# Patient Record
Sex: FEMALE | Race: WHITE | Employment: OTHER | URBAN - METROPOLITAN AREA
[De-identification: names, ages, dates, MRNs, and addresses within clinical notes are randomized per-mention and may not be internally consistent; named-entity substitution may affect disease eponyms.]

---

## 2023-06-08 LAB
ALANINE AMINOTRANSFERASE (SGPT) (U/L) IN SER/PLAS: 21 U/L (ref 7–45)
ALBUMIN (G/DL) IN SER/PLAS: 3.9 G/DL (ref 3.4–5)
ALKALINE PHOSPHATASE (U/L) IN SER/PLAS: 60 U/L (ref 33–136)
ANION GAP IN SER/PLAS: 15 MMOL/L (ref 10–20)
ASPARTATE AMINOTRANSFERASE (SGOT) (U/L) IN SER/PLAS: 20 U/L (ref 9–39)
BASOPHILS (10*3/UL) IN BLOOD BY AUTOMATED COUNT: 0.03 X10E9/L (ref 0–0.1)
BASOPHILS/100 LEUKOCYTES IN BLOOD BY AUTOMATED COUNT: 0.6 % (ref 0–2)
BILIRUBIN TOTAL (MG/DL) IN SER/PLAS: 0.7 MG/DL (ref 0–1.2)
CALCIUM (MG/DL) IN SER/PLAS: 9.7 MG/DL (ref 8.6–10.3)
CARBON DIOXIDE, TOTAL (MMOL/L) IN SER/PLAS: 27 MMOL/L (ref 21–32)
CHLORIDE (MMOL/L) IN SER/PLAS: 103 MMOL/L (ref 98–107)
CHOLESTEROL (MG/DL) IN SER/PLAS: 181 MG/DL (ref 0–199)
CHOLESTEROL IN HDL (MG/DL) IN SER/PLAS: 61.4 MG/DL
CHOLESTEROL/HDL RATIO: 2.9
CREATININE (MG/DL) IN SER/PLAS: 0.98 MG/DL (ref 0.5–1.05)
EOSINOPHILS (10*3/UL) IN BLOOD BY AUTOMATED COUNT: 0.16 X10E9/L (ref 0–0.4)
EOSINOPHILS/100 LEUKOCYTES IN BLOOD BY AUTOMATED COUNT: 3 % (ref 0–6)
ERYTHROCYTE DISTRIBUTION WIDTH (RATIO) BY AUTOMATED COUNT: 15.5 % (ref 11.5–14.5)
ERYTHROCYTE MEAN CORPUSCULAR HEMOGLOBIN CONCENTRATION (G/DL) BY AUTOMATED: 31.7 G/DL (ref 32–36)
ERYTHROCYTE MEAN CORPUSCULAR VOLUME (FL) BY AUTOMATED COUNT: 95 FL (ref 80–100)
ERYTHROCYTES (10*6/UL) IN BLOOD BY AUTOMATED COUNT: 4.37 X10E12/L (ref 4–5.2)
GFR FEMALE: 58 ML/MIN/1.73M2
GLUCOSE (MG/DL) IN SER/PLAS: 97 MG/DL (ref 74–99)
HEMATOCRIT (%) IN BLOOD BY AUTOMATED COUNT: 41.7 % (ref 36–46)
HEMOGLOBIN (G/DL) IN BLOOD: 13.2 G/DL (ref 12–16)
IMMATURE GRANULOCYTES/100 LEUKOCYTES IN BLOOD BY AUTOMATED COUNT: 0.4 % (ref 0–0.9)
LDL: 105 MG/DL (ref 0–99)
LEUKOCYTES (10*3/UL) IN BLOOD BY AUTOMATED COUNT: 5.4 X10E9/L (ref 4.4–11.3)
LYMPHOCYTES (10*3/UL) IN BLOOD BY AUTOMATED COUNT: 1.07 X10E9/L (ref 0.8–3)
LYMPHOCYTES/100 LEUKOCYTES IN BLOOD BY AUTOMATED COUNT: 19.9 % (ref 13–44)
MONOCYTES (10*3/UL) IN BLOOD BY AUTOMATED COUNT: 0.65 X10E9/L (ref 0.05–0.8)
MONOCYTES/100 LEUKOCYTES IN BLOOD BY AUTOMATED COUNT: 12.1 % (ref 2–10)
NEUTROPHILS (10*3/UL) IN BLOOD BY AUTOMATED COUNT: 3.45 X10E9/L (ref 1.6–5.5)
NEUTROPHILS/100 LEUKOCYTES IN BLOOD BY AUTOMATED COUNT: 64 % (ref 40–80)
PLATELETS (10*3/UL) IN BLOOD AUTOMATED COUNT: 167 X10E9/L (ref 150–450)
POTASSIUM (MMOL/L) IN SER/PLAS: 4.5 MMOL/L (ref 3.5–5.3)
PROTEIN TOTAL: 6.3 G/DL (ref 6.4–8.2)
SODIUM (MMOL/L) IN SER/PLAS: 140 MMOL/L (ref 136–145)
THYROTROPIN (MIU/L) IN SER/PLAS BY DETECTION LIMIT <= 0.05 MIU/L: 0.15 MIU/L (ref 0.44–3.98)
THYROXINE (T4) FREE (NG/DL) IN SER/PLAS: 1.67 NG/DL (ref 0.61–1.12)
TRIGLYCERIDE (MG/DL) IN SER/PLAS: 74 MG/DL (ref 0–149)
UREA NITROGEN (MG/DL) IN SER/PLAS: 16 MG/DL (ref 6–23)
VLDL: 15 MG/DL (ref 0–40)

## 2023-07-10 RX ORDER — BEMPEDOIC ACID 180 MG/1
1 TABLET, FILM COATED ORAL DAILY
COMMUNITY
Start: 2022-04-11 | End: 2024-04-22 | Stop reason: SDUPTHER

## 2023-07-10 RX ORDER — FLUTICASONE FUROATE AND VILANTEROL 200; 25 UG/1; UG/1
1 POWDER RESPIRATORY (INHALATION) DAILY
COMMUNITY
Start: 2022-06-21

## 2023-07-10 RX ORDER — CHOLECALCIFEROL (VITAMIN D3) 25 MCG
1 TABLET ORAL DAILY
COMMUNITY
Start: 2021-04-20

## 2023-07-10 RX ORDER — LEVOTHYROXINE SODIUM 88 UG/1
1 TABLET ORAL DAILY
COMMUNITY
Start: 2021-04-20 | End: 2023-07-11 | Stop reason: DRUGHIGH

## 2023-07-10 RX ORDER — MONTELUKAST SODIUM 10 MG/1
1 TABLET ORAL DAILY
COMMUNITY
Start: 2021-04-20 | End: 2023-07-11 | Stop reason: SDUPTHER

## 2023-07-10 RX ORDER — VERAPAMIL HYDROCHLORIDE 180 MG/1
1 CAPSULE, EXTENDED RELEASE ORAL NIGHTLY
COMMUNITY
Start: 2021-04-20 | End: 2023-07-11 | Stop reason: SDUPTHER

## 2023-07-10 RX ORDER — EPINEPHRINE 0.22MG
AEROSOL WITH ADAPTER (ML) INHALATION DAILY
COMMUNITY
Start: 2021-04-20

## 2023-07-10 RX ORDER — VITAMIN E (DL,TOCOPHERYL ACET) 180 MG
CAPSULE ORAL DAILY
COMMUNITY
Start: 2022-01-14

## 2023-07-10 RX ORDER — MINERAL OIL
1 ENEMA (ML) RECTAL DAILY
COMMUNITY
Start: 2021-04-20

## 2023-07-10 RX ORDER — PROPRANOLOL HYDROCHLORIDE 10 MG/1
TABLET ORAL
COMMUNITY
Start: 2021-04-20

## 2023-07-10 RX ORDER — ALBUTEROL SULFATE 90 UG/1
2 AEROSOL, METERED RESPIRATORY (INHALATION) 4 TIMES DAILY PRN
COMMUNITY
Start: 2021-04-20 | End: 2023-07-11 | Stop reason: SDUPTHER

## 2023-07-10 RX ORDER — AZELASTINE 1 MG/ML
1 SPRAY, METERED NASAL 2 TIMES DAILY
COMMUNITY
Start: 2022-06-21

## 2023-07-10 RX ORDER — ISOSORBIDE MONONITRATE 60 MG/1
1 TABLET, EXTENDED RELEASE ORAL DAILY
COMMUNITY
Start: 2021-04-20 | End: 2024-01-30

## 2023-07-11 ENCOUNTER — OFFICE VISIT (OUTPATIENT)
Dept: PRIMARY CARE | Facility: CLINIC | Age: 78
End: 2023-07-11
Payer: MEDICARE

## 2023-07-11 VITALS
WEIGHT: 157 LBS | HEART RATE: 78 BPM | BODY MASS INDEX: 26.16 KG/M2 | TEMPERATURE: 97.9 F | HEIGHT: 65 IN | OXYGEN SATURATION: 98 % | DIASTOLIC BLOOD PRESSURE: 68 MMHG | SYSTOLIC BLOOD PRESSURE: 120 MMHG

## 2023-07-11 DIAGNOSIS — E78.00 HYPERCHOLESTEROLEMIA: ICD-10-CM

## 2023-07-11 DIAGNOSIS — J30.1 ALLERGIC RHINITIS DUE TO POLLEN, UNSPECIFIED SEASONALITY: ICD-10-CM

## 2023-07-11 DIAGNOSIS — D86.0 SARCOIDOSIS OF LUNG (MULTI): ICD-10-CM

## 2023-07-11 DIAGNOSIS — I25.10 CORONARY ARTERY DISEASE INVOLVING NATIVE CORONARY ARTERY OF NATIVE HEART WITHOUT ANGINA PECTORIS: ICD-10-CM

## 2023-07-11 DIAGNOSIS — I10 PRIMARY HYPERTENSION: Primary | ICD-10-CM

## 2023-07-11 DIAGNOSIS — E03.9 HYPOTHYROIDISM, UNSPECIFIED TYPE: ICD-10-CM

## 2023-07-11 DIAGNOSIS — J45.20 MILD INTERMITTENT ASTHMA WITHOUT COMPLICATION (HHS-HCC): ICD-10-CM

## 2023-07-11 PROBLEM — J30.9 ALLERGIC RHINITIS: Status: ACTIVE | Noted: 2023-07-11

## 2023-07-11 PROBLEM — J45.909 BRONCHIAL ASTHMA (HHS-HCC): Status: ACTIVE | Noted: 2023-07-11

## 2023-07-11 PROCEDURE — 3074F SYST BP LT 130 MM HG: CPT | Performed by: INTERNAL MEDICINE

## 2023-07-11 PROCEDURE — 1159F MED LIST DOCD IN RCRD: CPT | Performed by: INTERNAL MEDICINE

## 2023-07-11 PROCEDURE — 99214 OFFICE O/P EST MOD 30 MIN: CPT | Performed by: INTERNAL MEDICINE

## 2023-07-11 PROCEDURE — 3078F DIAST BP <80 MM HG: CPT | Performed by: INTERNAL MEDICINE

## 2023-07-11 PROCEDURE — 1036F TOBACCO NON-USER: CPT | Performed by: INTERNAL MEDICINE

## 2023-07-11 RX ORDER — MONTELUKAST SODIUM 10 MG/1
10 TABLET ORAL DAILY
Qty: 90 TABLET | Refills: 1 | Status: SHIPPED | OUTPATIENT
Start: 2023-07-11 | End: 2024-01-09 | Stop reason: SDUPTHER

## 2023-07-11 RX ORDER — ALBUTEROL SULFATE 90 UG/1
2 AEROSOL, METERED RESPIRATORY (INHALATION) 4 TIMES DAILY PRN
Qty: 54 G | Refills: 1 | Status: SHIPPED | OUTPATIENT
Start: 2023-07-11

## 2023-07-11 RX ORDER — VERAPAMIL HYDROCHLORIDE 180 MG/1
180 CAPSULE, EXTENDED RELEASE ORAL NIGHTLY
Qty: 90 CAPSULE | Refills: 1 | Status: SHIPPED | OUTPATIENT
Start: 2023-07-11 | End: 2024-01-09 | Stop reason: SDUPTHER

## 2023-07-11 RX ORDER — LEVOTHYROXINE SODIUM 75 UG/1
75 TABLET ORAL DAILY
Qty: 90 TABLET | Refills: 1 | Status: SHIPPED | OUTPATIENT
Start: 2023-07-11 | End: 2024-01-09 | Stop reason: SDUPTHER

## 2023-07-11 ASSESSMENT — ENCOUNTER SYMPTOMS
DEPRESSION: 0
OCCASIONAL FEELINGS OF UNSTEADINESS: 0
LOSS OF SENSATION IN FEET: 0

## 2023-07-11 NOTE — PROGRESS NOTES
"Subjective   Patient ID: Maggie Bazan is a 77 y.o. female who presents for 6 month follow up .  Patient presents today in follow up re:   HTN, hypothyroid and         Review of Systems    Objective     Blood pressure 120/68, pulse 78, temperature 36.6 °C (97.9 °F), temperature source Temporal, height 1.638 m (5' 4.5\"), weight 71.2 kg (157 lb), SpO2 98 %.   Physical Exam    Assessment/Plan   Problem List Items Addressed This Visit       Allergic rhinitis    CAD (coronary artery disease)    Relevant Medications    isosorbide mononitrate ER (Imdur) 60 mg 24 hr tablet    verapamil ER (Veralan PM) 180 mg 24 hr capsule    propranolol (Inderal) 10 mg tablet    Bronchial asthma    HTN (hypertension) - Primary    Hypercholesterolemia    Hypothyroidism    Sarcoidosis of lung (CMS/HCC)     BP well controlled on current therapy.  Will continue present therapy and follow.  Lab shows current dose of thyroid replacement is too high.  Will reduce dose to 75 mcg daily and follow.  Asthma Sx well compensated on current therapy.  Will continue present medication therapy and follow clinically.  She continues to follow with Cardio and Pulmonology in re:  respective issues.    > 30 minutes was spent with the patient today, the majority of which was spent on review of history and medications as well as counselling on care plan and/or coordination of care.     Follow up in 6 months  Lab to be drawn 1 week prior to next office visit.      "

## 2023-08-07 DIAGNOSIS — E04.1 THYROID NODULE: Primary | ICD-10-CM

## 2023-08-16 DIAGNOSIS — E04.1 THYROID NODULE: Primary | ICD-10-CM

## 2023-08-24 ENCOUNTER — HOSPITAL ENCOUNTER (OUTPATIENT)
Dept: DATA CONVERSION | Facility: HOSPITAL | Age: 78
End: 2023-08-24
Attending: RADIOLOGY
Payer: MEDICARE

## 2023-08-24 DIAGNOSIS — E04.1 NONTOXIC SINGLE THYROID NODULE: ICD-10-CM

## 2023-08-28 LAB
COMPLETE PATHOLOGY REPORT: NORMAL
CONVERTED CLINICAL DIAGNOSIS-HISTORY: NORMAL
CONVERTED DIAGNOSIS COMMENT: NORMAL
CONVERTED FINAL DIAGNOSIS: NORMAL
CONVERTED FINAL REPORT PDF LINK TO COPY AND PASTE: NORMAL
CONVERTED SPECIMEN DESCRIPTION: NORMAL

## 2023-12-01 ENCOUNTER — LAB (OUTPATIENT)
Dept: LAB | Facility: LAB | Age: 78
End: 2023-12-01
Payer: MEDICARE

## 2023-12-01 DIAGNOSIS — I10 PRIMARY HYPERTENSION: ICD-10-CM

## 2023-12-01 DIAGNOSIS — E03.9 HYPOTHYROIDISM, UNSPECIFIED TYPE: ICD-10-CM

## 2023-12-01 DIAGNOSIS — I25.10 CORONARY ARTERY DISEASE INVOLVING NATIVE CORONARY ARTERY OF NATIVE HEART WITHOUT ANGINA PECTORIS: ICD-10-CM

## 2023-12-01 DIAGNOSIS — E78.00 HYPERCHOLESTEROLEMIA: ICD-10-CM

## 2023-12-01 DIAGNOSIS — E04.1 THYROID NODULE: ICD-10-CM

## 2023-12-01 LAB
ALBUMIN SERPL BCP-MCNC: 4.1 G/DL (ref 3.4–5)
ALP SERPL-CCNC: 63 U/L (ref 33–136)
ALT SERPL W P-5'-P-CCNC: 18 U/L (ref 7–45)
ANION GAP SERPL CALC-SCNC: 13 MMOL/L (ref 10–20)
AST SERPL W P-5'-P-CCNC: 22 U/L (ref 9–39)
BILIRUB SERPL-MCNC: 0.7 MG/DL (ref 0–1.2)
BUN SERPL-MCNC: 19 MG/DL (ref 6–23)
CALCIUM SERPL-MCNC: 9.6 MG/DL (ref 8.6–10.3)
CHLORIDE SERPL-SCNC: 103 MMOL/L (ref 98–107)
CHOLEST SERPL-MCNC: 206 MG/DL (ref 0–199)
CHOLESTEROL/HDL RATIO: 3.5
CO2 SERPL-SCNC: 27 MMOL/L (ref 21–32)
CREAT SERPL-MCNC: 0.99 MG/DL (ref 0.5–1.05)
GFR SERPL CREATININE-BSD FRML MDRD: 58 ML/MIN/1.73M*2
GLUCOSE SERPL-MCNC: 96 MG/DL (ref 74–99)
HDLC SERPL-MCNC: 58.1 MG/DL
LDLC SERPL CALC-MCNC: 126 MG/DL
NON HDL CHOLESTEROL: 148 MG/DL (ref 0–149)
POTASSIUM SERPL-SCNC: 4.2 MMOL/L (ref 3.5–5.3)
PROT SERPL-MCNC: 6.9 G/DL (ref 6.4–8.2)
SODIUM SERPL-SCNC: 139 MMOL/L (ref 136–145)
TRIGL SERPL-MCNC: 111 MG/DL (ref 0–149)
TSH SERPL-ACNC: 0.8 MIU/L (ref 0.44–3.98)
VLDL: 22 MG/DL (ref 0–40)

## 2023-12-01 PROCEDURE — 36415 COLL VENOUS BLD VENIPUNCTURE: CPT

## 2023-12-01 PROCEDURE — 80053 COMPREHEN METABOLIC PANEL: CPT

## 2023-12-01 PROCEDURE — 84443 ASSAY THYROID STIM HORMONE: CPT

## 2023-12-01 PROCEDURE — 80061 LIPID PANEL: CPT

## 2023-12-03 PROBLEM — I50.32 CHRONIC DIASTOLIC HEART FAILURE (MULTI): Status: ACTIVE | Noted: 2023-12-03

## 2023-12-03 PROBLEM — Z78.9 STATIN INTOLERANCE: Status: ACTIVE | Noted: 2023-12-03

## 2023-12-03 PROBLEM — E78.5 DYSLIPIDEMIA: Status: ACTIVE | Noted: 2023-12-03

## 2023-12-04 NOTE — PROGRESS NOTES
[facility] and Vascular Cardiology    Patient Name: Maggie Bazan  Patient : 1945      Scribe Attestation  By signing my name below, IViviana Scribe   attest that this documentation has been prepared under the direction and in the presence of Enrique Esquivel DO.      Reason for visit:  This is an 78-year-old female here for follow-up regarding her history of coronary artery disease as seen on cardiac catheterization done in 2017, chronic diastolic heart failure, hypertension, dyslipidemia with reported statin/Zetia intolerance.      HPI:  This is an 78-year-old female here for follow-up regarding her history of coronary artery disease as seen on cardiac catheterization done in 2017, chronic diastolic heart failure, hypertension, dyslipidemia with reported statin/Zetia intolerance.  The patient was last evaluated by me in 2022.  At that visit I discussed the possibility of a PCSK9 inhibitor to reduce her cholesterol which she declined and asked that she follow-up in 6 months and sooner if necessary.  The patient was subsequently seen by the cardiology NP in  at which time lab work including a lipid panel was ordered and she was asked to follow-up in December.  CMP done in 2023 showed normal serum sodium and potassium with a serum creatinine of 0.99, normal ALT/AST.  TSH was 0.80. Lipid panel done in 2023 showed an LDL cholesterol 126 and triglycerides of 111 while on bempedoic acid 180 mg daily. ECG done today showed sinus rhythm with sinus arrhythmia with a heart rate of 65 bpm.  The patient reports that she has been feeling generally well from the cardiac standpoint. She denies any new chest pain, shortness of breath, palpitations and lightheadedness. She states that she takes all of her medications as prescribed. During my exam, she was resting comfortably on the exam table.             Assessment/Plan:   1. Coronary artery  disease  The patient has a reported history of history of coronary artery disease as seen on cardiac catheterization done in June 2017.  She also had a history of vasospasm and is on Imdur and verapamil for symptom control.  Nuclear stress done 4/27/2022 showed a small partially reversible perfusion defect in the apical wall which resolved on prone imaging consistent with soft tissue attenuation, low probability of ischemia, calculated ejection fraction 64%.  ECG done today showed sinus arrhythmia with a heart rate of 65 bpm.     She denies anginal chest discomfort.   Blood pressure appears controlled on exam today.  She should continue her current antihypertensive medications.  Recent lab works as noted in the HPI.   Lipid panel done in December 2023 showed an LDL cholesterol 126 and triglycerides of 111 while on bempedoic acid 180 mg daily.   I would like to see a further decrease in her LDL cholesterol. I will start her on Repatha 140 mg subcutaneous every 2 weeks.  Lab works as noted below will be done in 6 months prior to his next visit.  Please see lifestyle recommendations below.  Follow up in 6 months and sooner if necessary.      2. Chronic diastolic heart failure  The patient has a history of chronic diastolic heart failure.  Nuclear stress done 4/27/2022 showed a small partially reversible perfusion defect in the apical wall which resolved on prone imaging consistent with soft tissue attenuation, low probability of ischemia, calculated ejection fraction 64%.  The patient reports having shortness of breath that responds to her inhalers.  Outside echocardiogram done 3/8/2021 showed normal left ventricular systolic function with an ejection fraction of 62%, left ventricular diastolic function, normal right ventricular systolic function, mild mitral and tricuspid valve regurgitation.  She does not appear significantly volume overloaded on physical exam.  She should continue current cardiac medications.    Recent lab works as noted in the HPI.   Lab works as noted below will be done in 6 months prior to his next visit.  I discussed with her the importance of following a low-sodium heart healthy diet.  Follow up in 6 months and sooner if necessary.      3. Hypertension  The patient has a history of hypertension and blood pressure appears controlled on exam today.   She should continue her current antihypertensive medications.      4. Dyslipidemia/Statin and Zetia intolerance  The patient has a history of dyslipidemia with reported statin/Zetia intolerance.  Lipid panel done in December 2023 showed an LDL cholesterol 126 and triglycerides of 111 while on bempedoic acid 180 mg daily.   I would like to see a further decrease in her LDL cholesterol. I will start her on Repatha 140 mg subcutaneous every 2 weeks.  I will update lipid panel in 6 months.  Please see lifestyle recommendations below.      Orders:   Start Repatha 140 mg subcutaneous every 2 weeks  CMP/lipid/magnesium/CBC/BNP in 6 months,   Follow-up in 6 months.    Lifestyle Recommendations  I recommend a whole-food plant-based diet, an eating pattern that encourages the consumption of unrefined plant foods (such as fruits, vegetables, tubers, whole grains, legumes, nuts and seeds) and discourages meats, dairy products, eggs and processed foods.     The AHA/ACC recommends that the patient consume a dietary pattern that emphasizes intake of vegetables, fruits, and whole grains; includes low-fat dairy products, poultry, fish, legumes, non-tropical vegetable oils, and nuts; and limits intake of sodium, sweets, sugar-sweetened beverages, and red meats.  Adapt this dietary pattern to appropriate calorie requirements (a 500-750 kcal/day deficit to loose weight), personal and cultural food preferences, and nutrition therapy for other medical conditions (including diabetes).  Achieve this pattern by following plans such as the [place] Mediterranean, DASH dietary pattern,  or AHA diet.     Engage in 2 hours and 30 minutes per week of moderate-intensity physical activity, or 1 hour and 15 minutes (75 minutes) per week of vigorous-intensity aerobic physical activity, or an equivalent combination of moderate and vigorous-intensity aerobic physical activity. Aerobic activity should be performed in episodes of at least 10 minutes preferably spread throughout the week.     Adhering to a heart healthy diet, regular exercise habits, avoidance of tobacco products, and maintenance of a healthy weight are crucial components of their heart disease risk reduction.     Any positive review of systems not specifically addressed in the office visit today should be evaluated and treated by the patients primary care physician or in an emergency department if necessary     Patient was notified that results from ordered tests will be called to the patient if it changes current management; it will otherwise be discussed at a future appointment and available on  [facility].     Thank you for allowing me to participate in the care of this patient.        This document was generated using the assistance of voice recognition software. If there are any errors of spelling, grammar, syntax, or meaning; please feel free to contact me directly for clarification.    Past Medical History:  She has no past medical history on file.    Past Surgical History:  She has a past surgical history that includes Other surgical history (04/20/2021); Other surgical history (04/20/2021); Other surgical history (04/20/2021); Other surgical history (05/07/2021); Other surgical history (05/07/2021); Tonsillectomy; Knee surgery (Right); and US guided thyroid biopsy (8/24/2023).      Social History:  She reports that she quit smoking about 31 years ago. Her smoking use included cigarettes. She has a 20.00 pack-year smoking history. She has never used smokeless tobacco. She reports current alcohol use of about 5.0 - 6.0 standard drinks  of alcohol per week. She reports that she does not use drugs.    Family History:  Family History   Problem Relation Name Age of Onset    Hypertension Mother      Hyperlipidemia Mother      Hyperlipidemia Father      Heart attack Mother's Brother      Heart attack Mother's Brother      Breast cancer Father's Sister      Breast cancer Father's Sister          Allergies:  Ezetimibe and Statins-hmg-coa reductase inhibitors    Outpatient Medications:  Current Outpatient Medications   Medication Instructions    albuterol 90 mcg/actuation inhaler 2 puffs, inhalation, 4 times daily PRN    azelastine (Astelin) 137 mcg (0.1 %) nasal spray 1 spray, nasal, 2 times daily    bempedoic acid (Nexletol) 180 mg tablet 1 tablet, oral, Daily    cholecalciferol (Vitamin D-3) 25 MCG (1000 UT) tablet 1 tablet, oral, Daily    coenzyme Q-10 100 mg capsule oral, Daily    fexofenadine (Allegra) 180 mg tablet 1 tablet, oral, Daily    fish oil concentrate (Omega-3) 120-180 mg capsule oral, Daily    fluticasone furoate-vilanteroL (Breo Ellipta) 200-25 mcg/dose inhaler 1 puff, inhalation, Daily    isosorbide mononitrate ER (Imdur) 60 mg 24 hr tablet 1 tablet, oral, Daily    L. acidophilus/Bifid. animalis 32 billion cell capsule oral, Daily    levothyroxine (SYNTHROID, LEVOXYL) 75 mcg, oral, Daily    montelukast (SINGULAIR) 10 mg, oral, Daily    multivit-min-folic acid-biotin (Hair,Skin and Nails,FA-biotin,) 133.3 mcg- 1,666.7 mcg capsule oral, Daily    propranolol (Inderal) 10 mg tablet oral    verapamil ER (VERALAN PM) 180 mg, oral, Nightly        ROS:  A 14 point review of systems was done and is negative other than as stated in HPI    Vitals:      7/13/2022    12:17 PM 9/20/2022    10:08 AM 12/2/2022     1:20 PM 1/12/2023    11:01 AM 3/20/2023     9:23 AM 6/15/2023    10:05 AM 7/11/2023    10:31 AM   Vitals   Systolic 120 123 128 130 152 160 120   Diastolic 64 68 68 60 74 72 68   Heart Rate 77 76 72 75 68 80 78   Temp 36.3 °C (97.4 °F) 36.2 °C  "(97.1 °F)  36.1 °C (96.9 °F) 36.4 °C (97.6 °F)  36.6 °C (97.9 °F)   Resp  16   16     Height (in)   1.626 m (5' 4\") 1.632 m (5' 4.25\")  1.638 m (5' 4.5\") 1.638 m (5' 4.5\")   Weight (lb) 166 168.2 170 166.2 162.8 154 157   BMI 28.49 kg/m2 28.87 kg/m2 29.18 kg/m2 28.31 kg/m2 27.73 kg/m2 26.03 kg/m2 26.53 kg/m2   BSA (m2) 1.84 m2 1.86 m2 1.87 m2 1.85 m2 1.83 m2 1.78 m2 1.8 m2        Physical Exam:   Constitutional: Cooperative, in no acute distress, alert, appears stated age.  Skin: Skin color, texture, turgor normal. No rashes or lesions.  Head: Normocephalic. No masses, lesions, tenderness or abnormalities  Eyes: Extraocular movements are grossly intact.  Mouth and throat: Mucous membranes moist  Neck: Neck supple, no carotid bruits, no JVD  Respiratory: Lungs clear to auscultation, no wheezing or rhonchi, no use of accessory muscles  Chest wall: No scars, normal excursion with respiration  Cardiovascular: Regular rhythm without murmur, gallop, or rubs  Gastrointestinal: Abdomen soft, nontender. Bowel sounds normal.  Musculoskeletal: Strength equal in upper extremities  Extremities: No bilateral pitting edema  Neurologic: Sensation grossly intact, alert and oriented x3.    Intake/Output:   No intake/output data recorded.    Outpatient Medications  Current Outpatient Medications on File Prior to Visit   Medication Sig Dispense Refill    albuterol 90 mcg/actuation inhaler Inhale 2 puffs 4 times a day as needed for wheezing or shortness of breath. 54 g 1    azelastine (Astelin) 137 mcg (0.1 %) nasal spray Administer 1 spray into affected nostril(s) 2 times a day.      bempedoic acid (Nexletol) 180 mg tablet Take 1 tablet by mouth once daily.      cholecalciferol (Vitamin D-3) 25 MCG (1000 UT) tablet Take 1 tablet (25 mcg) by mouth once daily.      coenzyme Q-10 100 mg capsule Take by mouth once daily.      fexofenadine (Allegra) 180 mg tablet Take 1 tablet (180 mg) by mouth once daily.      fish oil concentrate " (Omega-3) 120-180 mg capsule Take by mouth once daily.      fluticasone furoate-vilanteroL (Breo Ellipta) 200-25 mcg/dose inhaler Inhale 1 puff once daily.      isosorbide mononitrate ER (Imdur) 60 mg 24 hr tablet Take 1 tablet (60 mg) by mouth once daily.      L. acidophilus/Bifid. animalis 32 billion cell capsule Take by mouth once daily.      levothyroxine (Synthroid, Levoxyl) 75 mcg tablet Take 1 tablet (75 mcg) by mouth once daily. 90 tablet 1    montelukast (Singulair) 10 mg tablet Take 1 tablet (10 mg) by mouth once daily. 90 tablet 1    multivit-min-folic acid-biotin (Hair,Skin and Nails,FA-biotin,) 133.3 mcg- 1,666.7 mcg capsule Take by mouth once daily.      propranolol (Inderal) 10 mg tablet Take by mouth.      verapamil ER (Veralan PM) 180 mg 24 hr capsule Take 1 capsule (180 mg) by mouth once daily at bedtime. 90 capsule 1     No current facility-administered medications on file prior to visit.       Labs: (past 26 weeks)  Recent Results (from the past 4368 hour(s))   TSH with reflex to Free T4 if abnormal    Collection Time: 06/08/23 10:15 AM   Result Value Ref Range    TSH 0.15 (L) 0.44 - 3.98 mIU/L   Lipid Panel    Collection Time: 06/08/23 10:15 AM   Result Value Ref Range    Cholesterol 181 0 - 199 mg/dL    HDL 61.4 mg/dL    Cholesterol/HDL Ratio 2.9      (H) 0 - 99 mg/dL    VLDL 15 0 - 40 mg/dL    Triglycerides 74 0 - 149 mg/dL   Comprehensive Metabolic Panel    Collection Time: 06/08/23 10:15 AM   Result Value Ref Range    Glucose 97 74 - 99 mg/dL    Sodium 140 136 - 145 mmol/L    Potassium 4.5 3.5 - 5.3 mmol/L    Chloride 103 98 - 107 mmol/L    Bicarbonate 27 21 - 32 mmol/L    Anion Gap 15 10 - 20 mmol/L    Urea Nitrogen 16 6 - 23 mg/dL    Creatinine 0.98 0.50 - 1.05 mg/dL    GFR Female 58 (A) >90 mL/min/1.73m2    Calcium 9.7 8.6 - 10.3 mg/dL    Albumin 3.9 3.4 - 5.0 g/dL    Alkaline Phosphatase 60 33 - 136 U/L    Total Protein 6.3 (L) 6.4 - 8.2 g/dL    AST 20 9 - 39 U/L    Total  Bilirubin 0.7 0.0 - 1.2 mg/dL    ALT (SGPT) 21 7 - 45 U/L   CBC and Auto Differential    Collection Time: 06/08/23 10:15 AM   Result Value Ref Range    WBC 5.4 4.4 - 11.3 x10E9/L    RBC 4.37 4.00 - 5.20 x10E12/L    Hemoglobin 13.2 12.0 - 16.0 g/dL    Hematocrit 41.7 36.0 - 46.0 %    MCV 95 80 - 100 fL    MCHC 31.7 (L) 32.0 - 36.0 g/dL    Platelets 167 150 - 450 x10E9/L    RDW 15.5 (H) 11.5 - 14.5 %    Neutrophils % 64.0 40.0 - 80.0 %    Immature Granulocytes %, Automated 0.4 0.0 - 0.9 %    Lymphocytes % 19.9 13.0 - 44.0 %    Monocytes % 12.1 2.0 - 10.0 %    Eosinophils % 3.0 0.0 - 6.0 %    Basophils % 0.6 0.0 - 2.0 %    Neutrophils Absolute 3.45 1.60 - 5.50 x10E9/L    Lymphocytes Absolute 1.07 0.80 - 3.00 x10E9/L    Monocytes Absolute 0.65 0.05 - 0.80 x10E9/L    Eosinophils Absolute 0.16 0.00 - 0.40 x10E9/L    Basophils Absolute 0.03 0.00 - 0.10 x10E9/L   Thyroxine, Free    Collection Time: 06/08/23 10:15 AM   Result Value Ref Range    Free T4 1.67 (H) 0.61 - 1.12 ng/dL   CYTOLOGY NON-GYN RESULTS    Collection Time: 08/24/23 12:00 AM   Result Value Ref Range    Pathology Report                                                       MRN: [id number]  Patient Name JACKELINE CHANDLER  Accession #: [id number]  Date of Procedure:  8/24/2023       Date Reported: 8/28/2023  Date Received:  8/24/2023  Date of Birth / Sex 1945 (Age: 77) / F  Race: WHITE  Submitting Physician: CAMILLE HUDSON MD  Attending Physician: LYNETTE GARCIA M.D.           Other External #          FINAL CYTOLOGICAL INTERPRETATION    A.  FINE NEEDLE ASPIRATION OF THYROID - LEFT LOBE NODULE:       - NO MALIGNANT CELLS IDENTIFIED.       - CYTOLOGIC FINDINGS CONSISTENT WITH A BENIGN FOLLICULAR NODULE.    Note: The benign follicular nodule (BFN) encompasses a group of benign lesions  with similar cytologic findings that are classified histologically as nodules  in nodular goiter, hyperplastic (adenomatoid) nodules, colloid nodules, nodules  in Graves'  disease, and a subset of follicular adenomas. The distinction among  these different histologic entities is not possible by FNA, but this is of  little i mportance because they are all benign and can be managed in a similar,  conservative manner. This case has been evaluated using direct smears and  concentrated (ThinPrep) preparations.  All slides showed similar findings.     Lutz System Implied Risk of Malignancy and Recommended Clinical Management  Diagnostic category                      Risk of Malignancy      Usual  management  Nondiagnostic/Unsatisfactory                 5-10%                Repeat FNA  with ultrasound guidance  Benign                                                     0-3%                   Clinical and US follow-up  Atypia of undetermined significance      10-35%               Repeat FNA,  molecular testing, or     or Follicular lesion of US                     lobectomy  Follicular neoplasm/suspicious for        25-40%               Molecular  testing, surgical lobectomy     follicular neoplasm  Suspicious for malignancy                     50-75%               Lobectomy or  near total thyroidectomy  [facility] gnant                                               97-99%                Lobectomy or near total thyroidectomy      Slide(s) initially screened by a Cytotechnologist at [facility], [address] [place] [address]    Electronically Signed Out By ALKA DAVILA M.D.    By the signature on this report, the individual or group listed as making the  Final Interpretation/Diagnosis certifies that they have reviewed this case.  Slide(s) initially screened by a Cytotechnologist at [facility]  Diagnostic interpretation performed at [place] [phone]       Clinical History      2.6 x 1.6 x 1.3 cm solid hypoechoic nodule - left lobe of thyroid   Source of Specimen  A: FINE NEEDLE ASPIRATION  OF THYROID - LEFT LOBE NODULE     Specimen Submitted as:  A:  FINE NEEDLE ASPIRATION OF THYROID - LEFT LOBE NODULE        Pap non-gyn ThinPrep slide, Pap stain Non-Gyn, Pap stain Non-Gyn, Pap  stain Non-Gyn,  Pap stain Non-Gyn, Diff-Quik stain Non-Gyn, Diff-Quik stain  Non-Gyn, Diff-Quik stain Non-Gyn, Diff-Quik stain Non-Gyn    Gross Description  A.  FINE NEEDLE ASPIRATION OF THYROID - LEFT LOBE NODULE:  RECEIVED 8 DIRECT  SMEARS (4 AIR-DRIED AND 4 SPRAY-FIXED) AND 30 cc OF COLORLESS CLEAR NEEDLE  RINSE IN CYTOLYT WITH PARTICLES.               [facility]  Department of Pathology  [address] [place] [address]       Comprehensive Metabolic Panel    Collection Time: 12/01/23  8:27 AM   Result Value Ref Range    Glucose 96 74 - 99 mg/dL    Sodium 139 136 - 145 mmol/L    Potassium 4.2 3.5 - 5.3 mmol/L    Chloride 103 98 - 107 mmol/L    Bicarbonate 27 21 - 32 mmol/L    Anion Gap 13 10 - 20 mmol/L    Urea Nitrogen 19 6 - 23 mg/dL    Creatinine 0.99 0.50 - 1.05 mg/dL    eGFR 58 (L) >60 mL/min/1.73m*2    Calcium 9.6 8.6 - 10.3 mg/dL    Albumin 4.1 3.4 - 5.0 g/dL    Alkaline Phosphatase 63 33 - 136 U/L    Total Protein 6.9 6.4 - 8.2 g/dL    AST 22 9 - 39 U/L    Bilirubin, Total 0.7 0.0 - 1.2 mg/dL    ALT 18 7 - 45 U/L   Lipid Panel    Collection Time: 12/01/23  8:27 AM   Result Value Ref Range    Cholesterol 206 (H) 0 - 199 mg/dL    HDL-Cholesterol 58.1 mg/dL    Cholesterol/HDL Ratio 3.5     LDL Calculated 126 (H) <=99 mg/dL    VLDL 22 0 - 40 mg/dL    Triglycerides 111 0 - 149 mg/dL    Non HDL Cholesterol 148 0 - 149 mg/dL   Tsh With Reflex To Free T4 If Abnormal    Collection Time: 12/01/23  8:27 AM   Result Value Ref Range    Thyroid Stimulating Hormone 0.80 0.44 - 3.98 mIU/L       ECG  No results found for this or any previous visit (from the past 4464 hour(s)).    Echocardiogram  No results found for this or any previous visit from the past 1095 days.      CV  Studies:  EKG:No results found for this or any previous visit (from the past 4464 hour(s)).  Echocardiogram:   ECHOCARDIOGRAM     Narrative  Ordered by an unspecified provider.    Stress Testing LIZETH(RYQ9190:1:1825):   NM CARDIAC STRESS REST (MYOCARDIAL PERFUSION MIBI) 04/27/2022    Narrative  MRN: [id number]  Patient Name: JACKELINE CHANDLER    STUDY:  CARDIAC STRESS/REST INJECTION; PART 2 STRESS OR REST (NO CHARGE);  CARDIAC STRESS/REST (MYOCARDIAL PERFUSION/MIBI);  4/27/2022 10:16 am    INDICATION:  SOB  I10: HTN (hypertension) E78.00: Hypercholesterolemia I25.118:  Coronary artery disease with other form of angina pectoris.    COMPARISON:  None.    ACCESSION NUMBER(S):  [id number]; [id number]; [id number]    ORDERING CLINICIAN:  MARYCARMEN LANDA    TECHNIQUE:  DIVISION OF NUCLEAR MEDICINE  STRESS MYOCARDIAL PERFUSION SCAN, ONE DAY PROTOCOL    The patient received an intravenous dose of  11.5 mCi of Tc-99m  tetrofosmin and resting emission tomographic (SPECT) images of the  myocardium were acquired. The patient then exercised via treadmill  stress to  85 % of MPHR and achieved  10.1 METS. At peak stress  35.7  mCi of Tc-99m tetrofosmin were administered and stress phase SPECT  images of the myocardium were then acquired. These included ECG-gated  images to assess and quantify ventricular function.    FINDINGS:  There is a small partially reversible perfusion defect in the apical  wall which resolves on prone imaging suggesting soft tissue  attenuation.    Calculated ejection fraction is 64% without segmental wall motion  abnormalities seen.    Impression  1. There is a small partially reversible perfusion defect in the  apical wall which resolves on prone imaging suggesting soft tissue  attenuation. There is a low probability of ischemia.    2. Calculated ejection fraction is 64% without segmental wall motion  abnormalities seen.    Cardiac Catheterization: No results found for this or any previous visit from the past 1825  days.  No results found for this or any previous visit from the past 3650 days.     Cardiac Scoring: No results found for this or any previous visit from the past 1825 days.    AAA : No results found for this or any previous visit from the past 1825 days.    OTHER: No results found for this or any previous visit from the past 1825 days.    LAST IMAGING RESULTS  US guided thyroid biopsy  Narrative: Interpreted By:  LYNETET GARCIA MD  MRN: [id number]  Patient Name: JACKELINE CHANDLER     STUDY:  US BIOPSY THYROID;  8/24/2023 1:19 pm     INDICATION:  see Dx and prior ultrasound.     COMPARISON:  None.     ACCESSION NUMBER(S):  [id number]     ORDERING CLINICIAN:  CAMILLE HUDSON     TECHNIQUE:  INTERVENTIONALIST(S):        CONSENT:  The patient/patient's POA/next of kin was informed of the nature of  the proposed procedure. The purposes, alternatives, risks, and  benefits were explained and discussed. All questions were answered  and consent was obtained.     SEDATION:  Lidocaine was administered for local anesthesia. No IV sedation was  given     TIME OUT:  A time out was performed immediately prior to procedure start with  the interventional team, correctly identifying the patient name, date  of birth, MRN, procedure, anatomy (including marking of site and  side), patient position, procedure consent form, relevant laboratory  and imaging test results, antibiotic administration, safety  precautions, and procedure-specific equipment needs.     COMPLICATIONS:  No immediate adverse events identified.     FINDINGS:  The patient was placed in the supine position with the neck in an  extended position. Ultrasound of the thyroid was performed and  demonstrated left thyroid nodule as seen in the 08/10/2023 ultrasound  study. The nodule in the left lobe of the thyroid was selected for  biopsy. The patient was prepped and draped in normal sterile fashion.     Subcutaneous lidocaine was utilized for local anesthesia.  Subsequently, four  passes were made into the previously mentioned  nodule(s) using 25 gauge spinal needles under direct ultrasound  guidance. Images document the tip of the needle within the nodule(s).  Slides were prepared by on-site cytology. There were no immediate  complications.     Impression: Uneventful ultrasound-guided fine-needle aspiration biopsy of the  left thyroid, as detailed above.        Performed and dictated at [facility].     MACRO:  None    Problem List Items Addressed This Visit       CAD (coronary artery disease) - Primary    HTN (hypertension)    Dyslipidemia    Statin intolerance    Chronic diastolic heart failure (CMS/HCC)         Enrique Esquivel DO, FACC, FACOI

## 2023-12-08 ENCOUNTER — OFFICE VISIT (OUTPATIENT)
Dept: CARDIOLOGY | Facility: CLINIC | Age: 78
End: 2023-12-08
Payer: MEDICARE

## 2023-12-08 VITALS
HEART RATE: 65 BPM | BODY MASS INDEX: 24.66 KG/M2 | SYSTOLIC BLOOD PRESSURE: 118 MMHG | HEIGHT: 65 IN | DIASTOLIC BLOOD PRESSURE: 64 MMHG | WEIGHT: 148 LBS

## 2023-12-08 DIAGNOSIS — Z78.9 STATIN INTOLERANCE: ICD-10-CM

## 2023-12-08 DIAGNOSIS — I10 PRIMARY HYPERTENSION: ICD-10-CM

## 2023-12-08 DIAGNOSIS — I25.10 CORONARY ARTERY DISEASE INVOLVING NATIVE CORONARY ARTERY OF NATIVE HEART, UNSPECIFIED WHETHER ANGINA PRESENT: Primary | ICD-10-CM

## 2023-12-08 DIAGNOSIS — E78.5 DYSLIPIDEMIA: ICD-10-CM

## 2023-12-08 DIAGNOSIS — I50.32 CHRONIC DIASTOLIC HEART FAILURE (MULTI): ICD-10-CM

## 2023-12-08 PROCEDURE — 3078F DIAST BP <80 MM HG: CPT | Performed by: INTERNAL MEDICINE

## 2023-12-08 PROCEDURE — 1159F MED LIST DOCD IN RCRD: CPT | Performed by: INTERNAL MEDICINE

## 2023-12-08 PROCEDURE — 99214 OFFICE O/P EST MOD 30 MIN: CPT | Performed by: INTERNAL MEDICINE

## 2023-12-08 PROCEDURE — 1036F TOBACCO NON-USER: CPT | Performed by: INTERNAL MEDICINE

## 2023-12-08 PROCEDURE — 93000 ELECTROCARDIOGRAM COMPLETE: CPT | Performed by: INTERNAL MEDICINE

## 2023-12-08 PROCEDURE — 3074F SYST BP LT 130 MM HG: CPT | Performed by: INTERNAL MEDICINE

## 2023-12-08 RX ORDER — IBUPROFEN 600 MG/1
600 TABLET ORAL EVERY 6 HOURS PRN
COMMUNITY
Start: 2023-09-20

## 2023-12-08 RX ORDER — EVOLOCUMAB 140 MG/ML
140 INJECTION, SOLUTION SUBCUTANEOUS
Qty: 2 EACH | Refills: 12 | Status: SHIPPED | OUTPATIENT
Start: 2023-12-08 | End: 2024-02-26 | Stop reason: SDUPTHER

## 2023-12-08 RX ORDER — FAMOTIDINE 20 MG/1
1 TABLET, FILM COATED ORAL DAILY PRN
COMMUNITY
Start: 2021-02-12

## 2024-01-09 ENCOUNTER — OFFICE VISIT (OUTPATIENT)
Dept: PRIMARY CARE | Facility: CLINIC | Age: 79
End: 2024-01-09
Payer: MEDICARE

## 2024-01-09 ENCOUNTER — CLINICAL SUPPORT (OUTPATIENT)
Dept: PHYSICAL THERAPY | Facility: CLINIC | Age: 79
End: 2024-01-09
Payer: MEDICARE

## 2024-01-09 VITALS
DIASTOLIC BLOOD PRESSURE: 64 MMHG | SYSTOLIC BLOOD PRESSURE: 118 MMHG | BODY MASS INDEX: 24.26 KG/M2 | TEMPERATURE: 96.8 F | OXYGEN SATURATION: 94 % | HEART RATE: 54 BPM | WEIGHT: 145.8 LBS

## 2024-01-09 DIAGNOSIS — M54.2 CERVICALGIA OF OCCIPITO-ATLANTO-AXIAL REGION: ICD-10-CM

## 2024-01-09 DIAGNOSIS — D86.0 SARCOIDOSIS OF LUNG (MULTI): ICD-10-CM

## 2024-01-09 DIAGNOSIS — E78.00 HYPERCHOLESTEROLEMIA: ICD-10-CM

## 2024-01-09 DIAGNOSIS — I50.32 CHRONIC DIASTOLIC HEART FAILURE (MULTI): ICD-10-CM

## 2024-01-09 DIAGNOSIS — E03.9 HYPOTHYROIDISM, UNSPECIFIED TYPE: ICD-10-CM

## 2024-01-09 DIAGNOSIS — Z00.00 MEDICARE ANNUAL WELLNESS VISIT, SUBSEQUENT: Primary | ICD-10-CM

## 2024-01-09 DIAGNOSIS — I10 PRIMARY HYPERTENSION: ICD-10-CM

## 2024-01-09 DIAGNOSIS — J45.20 MILD INTERMITTENT ASTHMA WITHOUT COMPLICATION (HHS-HCC): ICD-10-CM

## 2024-01-09 PROCEDURE — 1036F TOBACCO NON-USER: CPT | Performed by: INTERNAL MEDICINE

## 2024-01-09 PROCEDURE — 3078F DIAST BP <80 MM HG: CPT | Performed by: INTERNAL MEDICINE

## 2024-01-09 PROCEDURE — 97161 PT EVAL LOW COMPLEX 20 MIN: CPT | Mod: GP

## 2024-01-09 PROCEDURE — 97110 THERAPEUTIC EXERCISES: CPT | Mod: GP

## 2024-01-09 PROCEDURE — 3074F SYST BP LT 130 MM HG: CPT | Performed by: INTERNAL MEDICINE

## 2024-01-09 PROCEDURE — 1159F MED LIST DOCD IN RCRD: CPT | Performed by: INTERNAL MEDICINE

## 2024-01-09 PROCEDURE — 99214 OFFICE O/P EST MOD 30 MIN: CPT | Performed by: INTERNAL MEDICINE

## 2024-01-09 PROCEDURE — G0439 PPPS, SUBSEQ VISIT: HCPCS | Performed by: INTERNAL MEDICINE

## 2024-01-09 PROCEDURE — 1170F FXNL STATUS ASSESSED: CPT | Performed by: INTERNAL MEDICINE

## 2024-01-09 RX ORDER — VERAPAMIL HYDROCHLORIDE 180 MG/1
180 CAPSULE, EXTENDED RELEASE ORAL NIGHTLY
Qty: 90 CAPSULE | Refills: 1 | Status: SHIPPED | OUTPATIENT
Start: 2024-01-09

## 2024-01-09 RX ORDER — MONTELUKAST SODIUM 10 MG/1
10 TABLET ORAL DAILY
Qty: 90 TABLET | Refills: 1 | Status: SHIPPED | OUTPATIENT
Start: 2024-01-09

## 2024-01-09 RX ORDER — LEVOTHYROXINE SODIUM 75 UG/1
75 TABLET ORAL DAILY
Qty: 90 TABLET | Refills: 1 | Status: SHIPPED | OUTPATIENT
Start: 2024-01-09

## 2024-01-09 ASSESSMENT — ENCOUNTER SYMPTOMS
GASTROINTESTINAL NEGATIVE: 1
DEPRESSION: 0
LOSS OF SENSATION IN FEET: 0
CARDIOVASCULAR NEGATIVE: 1
ENDOCRINE NEGATIVE: 1
RESPIRATORY NEGATIVE: 1
PSYCHIATRIC NEGATIVE: 1
EYES NEGATIVE: 1
OCCASIONAL FEELINGS OF UNSTEADINESS: 1
HEMATOLOGIC/LYMPHATIC NEGATIVE: 1
NEUROLOGICAL NEGATIVE: 1
ALLERGIC/IMMUNOLOGIC NEGATIVE: 1
CONSTITUTIONAL NEGATIVE: 1
NECK PAIN: 1

## 2024-01-09 ASSESSMENT — PATIENT HEALTH QUESTIONNAIRE - PHQ9
2. FEELING DOWN, DEPRESSED OR HOPELESS: NOT AT ALL
1. LITTLE INTEREST OR PLEASURE IN DOING THINGS: NOT AT ALL
SUM OF ALL RESPONSES TO PHQ9 QUESTIONS 1 AND 2: 0

## 2024-01-09 ASSESSMENT — ACTIVITIES OF DAILY LIVING (ADL)
TAKING_MEDICATION: INDEPENDENT
DOING_HOUSEWORK: INDEPENDENT
MANAGING_FINANCES: INDEPENDENT
BATHING: INDEPENDENT
DRESSING: INDEPENDENT
GROCERY_SHOPPING: INDEPENDENT

## 2024-01-09 ASSESSMENT — PAIN SCALES - GENERAL: PAINLEVEL_OUTOF10: 8

## 2024-01-09 ASSESSMENT — PAIN - FUNCTIONAL ASSESSMENT: PAIN_FUNCTIONAL_ASSESSMENT: 0-10

## 2024-01-09 NOTE — PROGRESS NOTES
Subjective   Patient ID: Maggie Bazan is a 78 y.o. female who presents for 6 month follow up  and Medicare Annual Wellness Visit Subsequent.  Patient presents today in follow up re:   HTN, hypothyroid and asthma.    Patient presents in follow up regarding hypertension.  Blood pressure has been well controlled on current therapy.  No adverse effects of medication reported.  Patient denies chest pain, palpitations, shortness of breath, orthopnea, fatigue or edema.    Patient presents in follow up regarding hypothyroidism.  Is taking medication as prescribed.  Has been feeling well.  Denies fatigue, weight gain or loss, diarrhea, constipation or change in hair/skin.    Pt. states asthma has been well controlled on current treatment regimen.  Has rare flares requiring rescue inhaler.  Denies cough, wheezing or other associated symptoms.          Review of Systems   Constitutional: Negative.    HENT: Negative.     Eyes: Negative.    Respiratory: Negative.     Cardiovascular: Negative.    Gastrointestinal: Negative.    Endocrine: Negative.    Genitourinary: Negative.    Musculoskeletal:  Positive for neck pain.   Skin: Negative.    Allergic/Immunologic: Negative.    Neurological: Negative.    Hematological: Negative.    Psychiatric/Behavioral: Negative.         Objective     Blood pressure 118/64, pulse 54, temperature 36 °C (96.8 °F), temperature source Temporal, weight 66.1 kg (145 lb 12.8 oz), SpO2 94 %.   Physical Exam  Vitals reviewed.   Constitutional:       Appearance: Normal appearance.   Neck:      Vascular: No carotid bruit.   Cardiovascular:      Rate and Rhythm: Normal rate and regular rhythm.      Pulses: Normal pulses.      Heart sounds: Normal heart sounds. No murmur heard.  Pulmonary:      Effort: Pulmonary effort is normal.      Breath sounds: Normal breath sounds. No wheezing or rales.   Abdominal:      General: Abdomen is flat. There is no distension.      Palpations: Abdomen is soft.       Tenderness: There is no abdominal tenderness.   Musculoskeletal:         General: Normal range of motion.      Cervical back: Normal range of motion and neck supple.   Skin:     General: Skin is warm and dry.   Neurological:      General: No focal deficit present.      Mental Status: She is alert and oriented to person, place, and time.   Psychiatric:         Mood and Affect: Mood normal.         Behavior: Behavior normal.         Assessment/Plan   Problem List Items Addressed This Visit       Bronchial asthma    Relevant Medications    montelukast (Singulair) 10 mg tablet    HTN (hypertension)    Relevant Medications    verapamil ER (Veralan PM) 180 mg 24 hr capsule    Other Relevant Orders    Comprehensive Metabolic Panel    Hypercholesterolemia    Relevant Orders    Comprehensive Metabolic Panel    Lipid Panel    Hypothyroidism    Relevant Medications    levothyroxine (Synthroid, Levoxyl) 75 mcg tablet    Other Relevant Orders    TSH with reflex to Free T4 if abnormal    Sarcoidosis of lung (CMS/HCC)    Chronic diastolic heart failure (CMS/HCC)    Relevant Medications    verapamil ER (Veralan PM) 180 mg 24 hr capsule     Other Visit Diagnoses       Medicare annual wellness visit, subsequent    -  Primary    Cervicalgia of ptjkigoe-gkklevi-wxyfa region        Relevant Orders    Referral to Physical Therapy          Medicare annual wellness completed with no new findings or issues identified.  Patient has documented advanced care planning and POA in place.  BP well controlled on current therapy.  Will continue present therapy and follow.  Euthyroid on current dose of supplemental thyroid.  Advised to continue present dose and will continue to follow.   Asthma Sx well compensated on current therapy.  Will continue present medication therapy and follow clinically.  She continues to follow with Cardio and Pulmonology in re:  respective issues.  She is having persistent issue with cervicalgia which has been present for  "about 6 months.  She has been seeing chiropractor who does \"adjustments\" but has not had her doing any muscular therapy.  Will refer to PT as she has no neuro Sx.    > 30 minutes was spent with the patient today, the majority of which was spent on review of history and medications as well as counselling on care plan and/or coordination of care.     Follow up in 6 months  Lab to be drawn 1 week prior to next office visit.      "

## 2024-01-09 NOTE — PROGRESS NOTES
"Physical Therapy    Physical Therapy Evaluation and Treatment      Patient Name: Maggie Bazan \"IRON\"  MRN: [id number]  Today's Date: 1/9/2024  Time Calculation  Start Time: 1217  Stop Time: 1300  Time Calculation (min): 43 min    Assessment:    Pt presenting to the clinic with chronic right sided neck pain following a traumatic event where she was hit in the head with a soccer ball 1 year ago. She is now having difficulty looking down for long periods of time, turning her head to drive, and sleeping. There is obvious limitations in cervical range of motion with hypertrophy of posterolateral neck musculature. There is poor arthrokinematic movement of her spinal segments. A course of PT will be indicated to address limitations and improve quality of life.     Plan:  OP PT Plan  PT Plan: Skilled PT  PT Frequency: 2 times per week  Duration: 12 weeks  Certification Period Start Date: 01/09/24  Certification Period End Date: 04/08/24  Rehab Potential: Excellent    Planned interventions include: biofeedback, cryotherapy, education/instruction, electrical stimulation, gait training, home program, hot pack, kinesiotaping, manual therapy, neuromuscular re-education, self care/home management, therapeutic activities, and therapeutic exercises.     Access Code: [email or web address]  URL: [email or web address]  Date: 01/09/2024  Prepared by: Roselyn Carlisle    Exercises  - Seated Scapular Retraction  - 1 x daily - 7 x weekly - 2 sets - 10 reps - 5 seconds  hold  - Sidelying Open Book Thoracic Lumbar Rotation and Extension  - 1 x daily - 7 x weekly - 2 sets - 10 reps - 5 seconds  hold  - Supine Cervical Retraction with Towel  - 1 x daily - 7 x weekly - 2 sets - 10 reps - 5 seconds  hold  - Standing Shoulder Row with Anchored Resistance  - 1 x daily - 7 x weekly - 2 sets - 10 reps - 5 seconds  hold    Current Problem:   1. Cervicalgia of lppnnqhp-spcrhsz-cvkth region  Referral to Physical Therapy    Follow Up In Physical " Therapy          Subjective    General:  General  Reason for Referral: Neck pain  Referred By: Dr. Star Beauchamp  Pt reports right sided neck pain. She reports getting hit in the head with a soccer ball last year and feels like pain has gotten better. Now she is having trouble turning her head. She has tried chiropractics weekly without success. She takes a couple of ibuprofen every day. She uses a CBD oil nightly.     Impairments:  Worse in the morning   Sleeping - has tried multiple pillows   Driving     She has tried a neck pillow/brace with vibration with some help   She has tried low level red light therapy     Pain is located   Rated 1/10 at best, 4-8/10 at the worst     Precautions:  Precautions  Precautions Comment: none     Pain:  Pain Assessment  Pain Assessment: 0-10  Pain Score: 8       Objective   AROM:  R cervical rotation 40  L cervical rotation 54  R cervical SB 12  L cervical SB 24   Cervical flexion: WFL with pain  Cervical extension: WFL   B shoulder WFL in all planes   PROM/Joint Mobility:  Decreased lateral motion mid to upper cervical to L   Decreased PROM upper cervical segments   Strength:  Cervical flexion: able to hold 10 seconds   Special Tests:    Palpation:  Tenderness R suboccipital   Tenderness mid to lower R lateral spine   Tenderness R proximal and distal upper trap     Observation:   Head tilted to left at rest   Increased hypertrophy SCM       Outcome Measures:  Other Measures  Neck Disability Index: 17/50     Goals:  1. Pt will be independent in HEP to maximize PT POC   2. Pt will be able to improve worst pain severity on NPRS by >2 points MCID   3. Pt will increase cervical rotation to >60 degrees bilaterally to turn head for blind spots comfortably while driving   4. Pt will improve NDI >50% raw score to show improvement in symptoms   5. Pt will be able to hold DNF endurance >30 seconds to improve cervical stabilization needed for reading or other dynamic tasks

## 2024-01-15 ENCOUNTER — TREATMENT (OUTPATIENT)
Dept: PHYSICAL THERAPY | Facility: CLINIC | Age: 79
End: 2024-01-15
Payer: MEDICARE

## 2024-01-15 DIAGNOSIS — M54.2 CERVICALGIA OF OCCIPITO-ATLANTO-AXIAL REGION: ICD-10-CM

## 2024-01-15 PROCEDURE — 97110 THERAPEUTIC EXERCISES: CPT | Mod: GP,CQ

## 2024-01-15 PROCEDURE — 97140 MANUAL THERAPY 1/> REGIONS: CPT | Mod: GP,CQ

## 2024-01-15 ASSESSMENT — PAIN - FUNCTIONAL ASSESSMENT: PAIN_FUNCTIONAL_ASSESSMENT: 0-10

## 2024-01-15 ASSESSMENT — PAIN SCALES - GENERAL: PAINLEVEL_OUTOF10: 3

## 2024-01-15 NOTE — PROGRESS NOTES
"1Physical Therapy    Physical Therapy Treatment    Patient Name: Maggie Bazan  MRN: [id number]  Today's Date: 1/15/2024  Time Calculation  Start Time: 0945  Stop Time: 1025  Time Calculation (min): 40 min    Visit 2    Assessment:   Pt. needed mod cueing to perform cervical retractions with the correct technique. Issued and reviewed cervical stretches for home. Pts. cervical rotation is very limited (R>L). Cueing needed to not turn her body when looking to both sides. Pt. reports decreased cervical tightness after Graston.     Plan:   Assess tolerance of today's visit/Graston.      Consider mechanical cervical traction next visit per pt. request.      Assess cervical rotation next visit.    Current Problem  1. Cervicalgia of jjaiaaci-ecdnxhz-wlect region  Follow Up In Physical Therapy            Subjective    Pt. has been using a menthol cream on her neck and doing red light therapy. This has been helpful. Her neck continues to be tight and she has difficulty turning her neck to both sides (Right >Left). Pt. went to a massage therapist ~ one month ago. Massage is helpful.    Precautions   Precautions  Precautions Comment: none      Pain  Pain Assessment  Pain Assessment: 0-10  Pain Score: 3  Pain Type: Chronic pain  Pain Location: Neck  Pain Orientation: Right    Objective   Added:  cervical stretches and Tband extensions  Reviewed HEP    Treatments:  EXERCISE Date 1/15/2400 Date Date Date    REPS REPS REPS REPS          Scapular retraction 20x3\"H      Cervical retraction 20x3\"H                                  Tband       Pull down Green 20x      Tband       Mid rows Green 20x             UT stretch 30\"H x3 ea      Levator stretch 30\"H x3 ea                                                                                   Graston : Bilateral UT, paraspinals and sub occipital 15 minutes                                            Goals:  1. Pt will be independent in HEP to maximize PT POC   2. Pt will be able to " improve worst pain severity on NPRS by >2 points MCID   3. Pt will increase cervical rotation to >60 degrees bilaterally to turn head for blind spots comfortably while driving   4. Pt will improve NDI >50% raw score to show improvement in symptoms   5. Pt will be able to hold DNF endurance >30 seconds to improve cervical stabilization needed for reading or other dynamic tasks

## 2024-01-19 ENCOUNTER — TREATMENT (OUTPATIENT)
Dept: PHYSICAL THERAPY | Facility: CLINIC | Age: 79
End: 2024-01-19
Payer: MEDICARE

## 2024-01-19 DIAGNOSIS — M54.2 CERVICALGIA OF OCCIPITO-ATLANTO-AXIAL REGION: ICD-10-CM

## 2024-01-19 PROCEDURE — 97140 MANUAL THERAPY 1/> REGIONS: CPT | Mod: GP,CQ

## 2024-01-19 PROCEDURE — 97110 THERAPEUTIC EXERCISES: CPT | Mod: GP,CQ

## 2024-01-19 ASSESSMENT — PAIN SCALES - GENERAL: PAINLEVEL_OUTOF10: 5 - MODERATE PAIN

## 2024-01-19 ASSESSMENT — PAIN - FUNCTIONAL ASSESSMENT: PAIN_FUNCTIONAL_ASSESSMENT: 0-10

## 2024-01-19 NOTE — PROGRESS NOTES
"1Physical Therapy    Physical Therapy Treatment    Patient Name: Maggie Bazan  MRN: [id number]  Today's Date: 1/19/2024  Time Calculation  Start Time: 1150  Stop Time: 1230  Time Calculation (min): 40 min    Visit 3    Assessment:   Decreased neck/UT tightness after Graston. Continue to review correct technique with   cervical stretches and cervical retractions.   Plan:    Consider mechanical cervical traction next visit per pt. request.      Assess cervical rotation next visit.    Current Problem  1. Cervicalgia of eluvreex-xphxmqb-hkwnj region  Follow Up In Physical Therapy            Subjective    Pt. had a massage (back and neck) earlier this week. No neck pain at rest. She does have increased right neck pain 5/10 with certain motions.    Precautions   Precautions  Precautions Comment: none      Pain  Pain Assessment  Pain Assessment: 0-10  Pain Score: 5 - Moderate pain  Pain Location: Neck  Pain Orientation: Right    Objective       Treatments:  EXERCISE Date 1/15/2400 Date 1/19/24 Date Date    REPS REPS REPS REPS          Scapular retraction 20x3\"H 20x     Cervical retraction 20x3\"H 20x                                 Tband       Pull down Green 20x Green 20x     Tband       Mid rows Green 20x Green 20x            UT stretch 30\"H x3 ea 30\"Hx3     Levator stretch 30\"H x3 ea 30\"Hx3                                                                                  Graston : Bilateral UT, paraspinals and sub occipital 15 minutes 15 min                                           Goals:  1. Pt will be independent in HEP to maximize PT POC   2. Pt will be able to improve worst pain severity on NPRS by >2 points MCID   3. Pt will increase cervical rotation to >60 degrees bilaterally to turn head for blind spots comfortably while driving   4. Pt will improve NDI >50% raw score to show improvement in symptoms   5. Pt will be able to hold DNF endurance >30 seconds to improve cervical stabilization needed for reading or " other dynamic tasks

## 2024-01-22 ENCOUNTER — TREATMENT (OUTPATIENT)
Dept: PHYSICAL THERAPY | Facility: CLINIC | Age: 79
End: 2024-01-22
Payer: MEDICARE

## 2024-01-22 DIAGNOSIS — M54.2 CERVICALGIA OF OCCIPITO-ATLANTO-AXIAL REGION: ICD-10-CM

## 2024-01-22 PROCEDURE — 97110 THERAPEUTIC EXERCISES: CPT | Mod: GP,CQ

## 2024-01-22 PROCEDURE — 97140 MANUAL THERAPY 1/> REGIONS: CPT | Mod: GP,CQ

## 2024-01-22 ASSESSMENT — PAIN SCALES - GENERAL: PAINLEVEL_OUTOF10: 3

## 2024-01-22 ASSESSMENT — PAIN - FUNCTIONAL ASSESSMENT: PAIN_FUNCTIONAL_ASSESSMENT: 0-10

## 2024-01-22 NOTE — PROGRESS NOTES
"1Physical Therapy    Physical Therapy Treatment    Patient Name: Maggie Bazan  MRN: [id number]  Today's Date: 1/22/2024  Time Calculation  Start Time: 0200  Stop Time: 0240  Time Calculation (min): 40 min    Visit 4    Assessment:   Pt. responded well to manual cervical traction/occipital release and PROM stretches. Decreased right neck and UT \"pinching and tightness\". Right sided rotation is limited.    Plan:    Try cervical mechanical traction next visit.      Assess cervical rotation next visit.    Current Problem  1. Cervicalgia of idnjzccl-iyfddpy-hgloc region  Follow Up In Physical Therapy            Subjective    Pts. reports that her home exercises are helpful. She has difficulty finding a comfortable position to sleep in. Her neck is tight in the morning.    Precautions   Precautions  Precautions Comment: none      Pain  Pain Assessment  Pain Assessment: 0-10  Pain Score: 3  Pain Type: Chronic pain  Pain Location: Neck  Pain Orientation: Right    Objective   Added manual cervical traction.    Treatments:  EXERCISE Date 1/15/2400 Date 1/19/24 Date 1/22/24 Date    REPS REPS REPS REPS          Scapular retraction 20x3\"H 20x review    Cervical retraction 20x3\"H 20x review                                Tband       Pull down Green 20x Green 20x Green 20x    Tband       Mid rows Green 20x Green 20x Green 20x           UT stretch 30\"H x3 ea 30\"Hx3 30\"H x3    Levator stretch 30\"H x3 ea 30\"Hx3 30\"Hx3                                                                                 Graston : Bilateral UT, paraspinals and sub occipital 15 minutes 15 min 10 min    Manual cervical traction/occipital release/cervical rotation stretch   15 min                                   Goals:  1. Pt will be independent in HEP to maximize PT POC   2. Pt will be able to improve worst pain severity on NPRS by >2 points MCID   3. Pt will increase cervical rotation to >60 degrees bilaterally to turn head for blind spots comfortably " while driving   4. Pt will improve NDI >50% raw score to show improvement in symptoms   5. Pt will be able to hold DNF endurance >30 seconds to improve cervical stabilization needed for reading or other dynamic tasks

## 2024-01-24 ENCOUNTER — TREATMENT (OUTPATIENT)
Dept: PHYSICAL THERAPY | Facility: CLINIC | Age: 79
End: 2024-01-24
Payer: MEDICARE

## 2024-01-24 DIAGNOSIS — M54.2 CERVICALGIA OF OCCIPITO-ATLANTO-AXIAL REGION: ICD-10-CM

## 2024-01-24 PROCEDURE — 97012 MECHANICAL TRACTION THERAPY: CPT | Mod: GP,CQ

## 2024-01-24 PROCEDURE — 97140 MANUAL THERAPY 1/> REGIONS: CPT | Mod: GP,CQ,59

## 2024-01-24 PROCEDURE — 97110 THERAPEUTIC EXERCISES: CPT | Mod: GP,CQ

## 2024-01-24 ASSESSMENT — PAIN - FUNCTIONAL ASSESSMENT: PAIN_FUNCTIONAL_ASSESSMENT: 0-10

## 2024-01-24 ASSESSMENT — PAIN SCALES - GENERAL: PAINLEVEL_OUTOF10: 6

## 2024-01-24 ASSESSMENT — PAIN DESCRIPTION - DESCRIPTORS: DESCRIPTORS: SHARP

## 2024-01-24 NOTE — PROGRESS NOTES
"Physical Therapy    Physical Therapy Treatment    Patient Name: Maggie Bazan  MRN: [id number]  Today's Date: 1/24/2024  Time Calculation  Start Time: 0200  Stop Time: 0240  Time Calculation (min): 40 min    Visit 5    Assessment:   Pt. needed max cueing to perform cervical stretches with the correct form.   Sitting and standing posture have improved.   Pt. responded well to cervical traction. Decreased pain.    Plan:  Continue to increase cervical ROM and decrease pain for improved function.    Current Problem  1. Cervicalgia of zlkwsfta-pjkvwmd-lqwrg region  Follow Up In Physical Therapy            Subjective    Pts. reports that she has \"stabbing pain\" when she moves her neck. It feels ok when she is at rest. Pt. did not put lidocaine cream on today.      Precautions   Precautions  Precautions Comment: none      Pain  Pain Assessment  Pain Assessment: 0-10  Pain Score: 6  Pain Type: Chronic pain  Pain Location: Neck  Pain Orientation: Right  Pain Descriptors: Sharp    Objective   Trial mechanical cervical traction 14#/6# - 15 minutes    Treatments:  EXERCISE Date 1/15/2400 Date 1/19/24 Date 1/22/24 Date 1/24/24    REPS REPS REPS REPS          Scapular retraction 20x3\"H 20x review    Cervical retraction 20x3\"H 20x review                         Tband       \"no money\"    Orange 2x10   Tband       Pull down Green 20x Green 20x Green 20x Green 30x   Tband       Mid rows Green 20x Green 20x Green 20x Green 30x          UT stretch 30\"H x3 ea 30\"Hx3 30\"H x3 review   Levator stretch 30\"H x3 ea 30\"Hx3 30\"Hx3 review                                                                                Graston : Bilateral UT, paraspinals and sub occipital 15 minutes 15 min 10 min 10min   Manual cervical traction/occipital release/cervical rotation stretch   15 min    Mechanical cervical traction    15 min 14#/6#                           Goals:  1. Pt will be independent in HEP to maximize PT POC   2. Pt will be able to improve " worst pain severity on NPRS by >2 points MCID   3. Pt will increase cervical rotation to >60 degrees bilaterally to turn head for blind spots comfortably while driving   4. Pt will improve NDI >50% raw score to show improvement in symptoms   5. Pt will be able to hold DNF endurance >30 seconds to improve cervical stabilization needed for reading or other dynamic tasks

## 2024-01-29 ENCOUNTER — TELEMEDICINE (OUTPATIENT)
Dept: PRIMARY CARE | Facility: CLINIC | Age: 79
End: 2024-01-29
Payer: MEDICARE

## 2024-01-29 ENCOUNTER — TREATMENT (OUTPATIENT)
Dept: PHYSICAL THERAPY | Facility: CLINIC | Age: 79
End: 2024-01-29
Payer: MEDICARE

## 2024-01-29 DIAGNOSIS — M54.2 CERVICALGIA OF OCCIPITO-ATLANTO-AXIAL REGION: ICD-10-CM

## 2024-01-29 DIAGNOSIS — B35.1 FUNGAL INFECTION OF NAIL: Primary | ICD-10-CM

## 2024-01-29 PROCEDURE — 97110 THERAPEUTIC EXERCISES: CPT | Mod: GP,CQ

## 2024-01-29 PROCEDURE — 99212 OFFICE O/P EST SF 10 MIN: CPT

## 2024-01-29 PROCEDURE — 97140 MANUAL THERAPY 1/> REGIONS: CPT | Mod: GP,CQ,59

## 2024-01-29 PROCEDURE — 97012 MECHANICAL TRACTION THERAPY: CPT | Mod: GP,CQ

## 2024-01-29 ASSESSMENT — PAIN SCALES - GENERAL: PAINLEVEL_OUTOF10: 5 - MODERATE PAIN

## 2024-01-29 ASSESSMENT — PAIN - FUNCTIONAL ASSESSMENT: PAIN_FUNCTIONAL_ASSESSMENT: 0-10

## 2024-01-29 NOTE — PROGRESS NOTES
"Physical Therapy    Physical Therapy Treatment    Patient Name: Maggie Bazan  MRN: [id number]  Today's Date: 1/29/2024  Time Calculation  Start Time: 1115  Stop Time: 1200  Time Calculation (min): 45 min    Visit 6    Assessment:   Pt. needed max cueing to perform cervical stretches with the correct form. Pt. reports improved neck motion with driving.   Right neck pain increases with rotation and SB to the right.     Plan:  Continue to increase cervical ROM and decrease pain for improved function.    Assess cervical ROM.    Current Problem  1. Cervicalgia of ukdovdna-hdkrnyl-dpqln region  Follow Up In Physical Therapy            Subjective    Pts. reports that she drove five hours from [place] yesterday. Her neck was sore afterwards. Unable to do her home exercises as recommended over the weekend.     Precautions   Precautions  Precautions Comment: none      Pain  Pain Assessment  Pain Assessment: 0-10  Pain Score: 5 - Moderate pain  Pain Type: Chronic pain  Pain Location: Neck  Pain Orientation: Right    Objective   Added pulley flexion and Tband horizontal abduction      Treatments:  EXERCISE Date 1/29/24 Date  Date  Date     REPS REPS REPS REPS          Scapular retraction 20x3\"H      Cervical retraction 20x3\"H             Pulleys flexion 3 min      Tband  horizontal ABD Orange 20x      Tband       \"no money\" Orange 20x      Tband       Pull down Green 20x      Tband       Mid rows Green 20x             UT stretch 30\"H x3 ea      Levator stretch 30\"H x3 ea                                                                                   Graston : Bilateral UT, paraspinals and sub occipital 10 minutes      Manual cervical traction/occipital release/cervical rotation stretch       Mechanical cervical traction 17#/2# 15 min                              Goals:  1. Pt will be independent in HEP to maximize PT POC   2. Pt will be able to improve worst pain severity on NPRS by >2 points MCID   3. Pt will increase " cervical rotation to >60 degrees bilaterally to turn head for blind spots comfortably while driving   4. Pt will improve NDI >50% raw score to show improvement in symptoms   5. Pt will be able to hold DNF endurance >30 seconds to improve cervical stabilization needed for reading or other dynamic tasks

## 2024-01-29 NOTE — PROGRESS NOTES
Subjective   Patient ID: IRON Bazan is a 78 y.o. female who presents for nail fungus.    HPI   With patient's permission, this is a Telemedicine visit with video and audio. Provider located at office address. Patient located at their home address. All issues as documented below were discussed and addressed but limited physical exam was performed. If it was felt that the patient should be evaluated via face-to-face office appointment(s) they were directed to appropriate location.     Iron is seen today for c/o nail fungus.  She was recently seen at a nail salon and was told she had fungus on her fingernails.  She is requesting oral medication as directed by the manicurist.  Has not tried topical treatment yet.  Has appointment with dermatologist in February.     Review of Systems   Skin:  Positive for rash.   All other systems reviewed and are negative.      Objective   There were no vitals taken for this visit.    Physical Exam  Limited due to virtual encounter.   General:  Appears alert and oriented and well-nourished with no signs of acute distress.   Face:  Normal appearing with no obvious rash or neurological deficits.  Eyes:  No conjunctival inflammation or scleral icterus.  EOMI x2.  Respiratory:  Breathing is non-labored.   Psychiatric:  Affect is appropriate and shows good judgment.     Assessment/Plan   Problem List Items Addressed This Visit    None  Visit Diagnoses         Codes    Fungal infection of nail    -  Primary  Recommended OTC topical antifungal.  Discussed risks of oral antifungal.  This is something that would have to be prescribed by PCP or dermatologist as it requires monitoring.  Keep nails clean and dry, clip nails straight across to avoid any infections.  Follow-up with dermatology as planned. B35.1

## 2024-01-30 DIAGNOSIS — I25.10 CORONARY ARTERY DISEASE INVOLVING NATIVE CORONARY ARTERY OF NATIVE HEART, UNSPECIFIED WHETHER ANGINA PRESENT: Primary | ICD-10-CM

## 2024-01-30 RX ORDER — ISOSORBIDE MONONITRATE 60 MG/1
60 TABLET, EXTENDED RELEASE ORAL DAILY
Qty: 90 TABLET | Refills: 3 | Status: SHIPPED | OUTPATIENT
Start: 2024-01-30

## 2024-02-01 ENCOUNTER — TREATMENT (OUTPATIENT)
Dept: PHYSICAL THERAPY | Facility: CLINIC | Age: 79
End: 2024-02-01
Payer: MEDICARE

## 2024-02-01 DIAGNOSIS — M54.2 CERVICALGIA OF OCCIPITO-ATLANTO-AXIAL REGION: ICD-10-CM

## 2024-02-01 PROCEDURE — 97110 THERAPEUTIC EXERCISES: CPT | Mod: GP

## 2024-02-01 PROCEDURE — 97140 MANUAL THERAPY 1/> REGIONS: CPT | Mod: GP

## 2024-02-01 PROCEDURE — 97012 MECHANICAL TRACTION THERAPY: CPT | Mod: 59,GP

## 2024-02-01 NOTE — PROGRESS NOTES
"Physical Therapy    Physical Therapy Treatment    Patient Name: Maggie Bazan  MRN: [id number]  Today's Date: 2/1/2024  Time Calculation  Start Time: 1104  Stop Time: 1145  Time Calculation (min): 41 min    Visit 7    Assessment:   Pt with improving symptoms, although temporary.  Increased R sided suboccipital dysfunction     Plan:  Will recheck and determine future visits at that time. Continue postural reeducation and posterior chain stability     Current Problem  1. Cervicalgia of prxoxyvr-aqovyfl-crnru region  Follow Up In Physical Therapy            Subjective    Pt with overall improvement with pain intensity. Pain is 4 on average and can get up to a temporary 7/10. She saw a acupuncturist which gave her 1 day of relief.     Precautions   Precautions  Precautions Comment: none      Pain       Objective     Treatments:  EXERCISE Date 1/29/24 Date 2/1/24 Date  Date     REPS REPS REPS REPS          Scapular retraction 20x3\"H      Cervical retraction 20x3\"H With lift 2x 10     Sidelying row   Green 20# 2 x 10     Sidelying thoracic rotation  2 x 10 R/L      Supine bar overhead lift for thoracic extension  2 x 10      Pulleys flexion 3 min      Tband  horizontal ABD Orange 20x      Tband       \"no money\" Orange 20x      Tband       Pull down Green 20x      Tband       Mid rows Green 20x             UT stretch 30\"H x3 ea      Levator stretch 30\"H x3 ea      Manual   Suboccipital release        Manual traction                                                                     Graston : Bilateral UT, paraspinals and sub occipital 10 minutes      Manual cervical traction/occipital release/cervical rotation stretch  Performed in supine      Mechanical cervical traction 17#/2# 15 min 18#/3#                             Goals:  1. Pt will be independent in HEP to maximize PT POC   2. Pt will be able to improve worst pain severity on NPRS by >2 points MCID   3. Pt will increase cervical rotation to >60 degrees " bilaterally to turn head for blind spots comfortably while driving   4. Pt will improve NDI >50% raw score to show improvement in symptoms   5. Pt will be able to hold DNF endurance >30 seconds to improve cervical stabilization needed for reading or other dynamic tasks

## 2024-02-05 ENCOUNTER — TREATMENT (OUTPATIENT)
Dept: PHYSICAL THERAPY | Facility: CLINIC | Age: 79
End: 2024-02-05
Payer: MEDICARE

## 2024-02-05 DIAGNOSIS — M54.2 CERVICALGIA OF OCCIPITO-ATLANTO-AXIAL REGION: ICD-10-CM

## 2024-02-05 PROCEDURE — 97012 MECHANICAL TRACTION THERAPY: CPT | Mod: GP,CQ

## 2024-02-05 PROCEDURE — 97140 MANUAL THERAPY 1/> REGIONS: CPT | Mod: GP,CQ

## 2024-02-05 PROCEDURE — 97110 THERAPEUTIC EXERCISES: CPT | Mod: GP,CQ

## 2024-02-05 ASSESSMENT — PAIN - FUNCTIONAL ASSESSMENT: PAIN_FUNCTIONAL_ASSESSMENT: 0-10

## 2024-02-05 ASSESSMENT — PAIN SCALES - GENERAL: PAINLEVEL_OUTOF10: 2

## 2024-02-05 NOTE — PROGRESS NOTES
"Physical Therapy    Physical Therapy Treatment    Patient Name: Maggie Bazan  MRN: [id number]  Today's Date: 2/5/2024  Time Calculation  Start Time: 1030  Stop Time: 1115  Time Calculation (min): 45 min    Visit 8    Assessment:   Pt with improving symptoms, although temporary.  Right cervical rotation and SB deficits.   Improved motion after Graston.    Plan:  Will recheck and determine future visits next session. Continue postural reeducation and posterior chain stability.       Current Problem  1. Cervicalgia of vjyrhvus-bamudse-vueof region  Follow Up In Physical Therapy            Subjective    Pt. reports that she has good and bad days. Neck massage given by her niece over the weekend. Helpful with decreasing pain.    Precautions   Precautions  Precautions Comment: none      Pain  Pain Assessment  Pain Assessment: 0-10  Pain Score: 2  Pain Type: Chronic pain  Pain Location: Neck  Pain Orientation: Right    Objective     Treatments:  EXERCISE Date 1/29/24 Date 2/1/24 Date 2/5/24 Date     REPS REPS REPS REPS          Scapular retraction 20x3\"H  20x    Cervical retraction 20x3\"H With lift 2x 10 20x    Sidelying row   Green 20# 2 x 10     Sidelying thoracic rotation  2 x 10 R/L      Supine bar overhead lift for thoracic extension  2 x 10      Pulleys flexion 3 min  3 min    Tband  horizontal ABD Orange 20x  Green 20x    Tband       \"no money\" Orange 20x  Green 20x    Tband       Pull down Green 20x  Blue 20x    Tband       Mid rows Green 20x  Blue 20x           UT stretch 30\"H x3 ea      Levator stretch 30\"H x3 ea      Manual   Suboccipital release        Manual traction                                                                     Graston : Bilateral UT, paraspinals and sub occipital 10 minutes  10 minutes    Manual cervical traction/occipital release/cervical rotation stretch  Performed in supine      Mechanical cervical traction 17#/2# 15 min 18#/3# 18#/3# 15min                            Goals:  1. Pt " will be independent in HEP to maximize PT POC   2. Pt will be able to improve worst pain severity on NPRS by >2 points MCID   3. Pt will increase cervical rotation to >60 degrees bilaterally to turn head for blind spots comfortably while driving   4. Pt will improve NDI >50% raw score to show improvement in symptoms   5. Pt will be able to hold DNF endurance >30 seconds to improve cervical stabilization needed for reading or other dynamic tasks

## 2024-02-08 ENCOUNTER — TREATMENT (OUTPATIENT)
Dept: PHYSICAL THERAPY | Facility: CLINIC | Age: 79
End: 2024-02-08
Payer: MEDICARE

## 2024-02-08 DIAGNOSIS — M54.2 CERVICALGIA OF OCCIPITO-ATLANTO-AXIAL REGION: ICD-10-CM

## 2024-02-08 PROCEDURE — 97012 MECHANICAL TRACTION THERAPY: CPT | Mod: GP

## 2024-02-08 PROCEDURE — 97535 SELF CARE MNGMENT TRAINING: CPT | Mod: GP

## 2024-02-08 PROCEDURE — 97110 THERAPEUTIC EXERCISES: CPT | Mod: GP

## 2024-02-08 NOTE — PROGRESS NOTES
"Physical Therapy - RECHECK     Physical Therapy Treatment    Patient Name: Maggie Bazan  MRN: [id number]  Today's Date: 2/8/2024  Time Calculation  Start Time: 1030  Stop Time: 1115  Time Calculation (min): 45 min    Visit 9    Assessment:   Although pt has not met functional goals, pt with minimal pain and or deficits at this time. She is safe to be discharged to home exercise program.     Plan:  All patient's questions were answered and will discharge and follow up if needed.     Current Problem  1. Cervicalgia of lnkaqrtn-rsgnrhq-fkatu region  Follow Up In Physical Therapy            Subjective    She reports going to an acupuncturist and feels like that is helpful. She has one more appointment scheduled with them.  She thinks she can turn her head without sharp pain now.     Precautions   Precautions  Precautions Comment: none      Pain   3-4/10    Objective     Treatments:  EXERCISE Date 1/29/24 Date 2/1/24 Date 2/5/24 Date 2-8-24    REPS REPS REPS REPS       Review HEP    Scapular retraction 20x3\"H  20x    Cervical retraction 20x3\"H With lift 2x 10 20x    Sidelying row   Green 20# 2 x 10     Sidelying thoracic rotation  2 x 10 R/L      Supine bar overhead lift for thoracic extension  2 x 10      Pulleys flexion 3 min  3 min    Tband  horizontal ABD Orange 20x  Green 20x    Tband       \"no money\" Orange 20x  Green 20x    Tband       Pull down Green 20x  Blue 20x    Tband       Mid rows Green 20x  Blue 20x           UT stretch 30\"H x3 ea      Levator stretch 30\"H x3 ea      Manual   Suboccipital release        Manual traction      Wall push up    2 x 10    Standing thoracic rotation     2 x 10    Seated thoracic rotation     2 x 10                                              Graston : Bilateral UT, paraspinals and sub occipital 10 minutes  10 minutes    Manual cervical traction/occipital release/cervical rotation stretch  Performed in supine      Mechanical cervical traction 17#/2# 15 min 18#/3# 18#/3# 15min " "\" \"       Performed this visit    Recheck - POC, goals                 Goals:  1. Pt will be independent in HEP to maximize PT POC --> MET   2. Pt will be able to improve worst pain severity on NPRS by >2 points MCID --> MET   3. Pt will increase cervical rotation to >60 degrees bilaterally to turn head for blind spots comfortably while driving --> NOT MET   4. Pt will improve NDI >50% raw score to show improvement in symptoms --> MET   5. Pt will be able to hold DNF endurance >30 seconds to improve cervical stabilization needed for reading or other dynamic tasks --> NOT MET   "

## 2024-02-13 ENCOUNTER — OFFICE VISIT (OUTPATIENT)
Dept: DERMATOLOGY | Facility: CLINIC | Age: 79
End: 2024-02-13
Payer: MEDICARE

## 2024-02-13 DIAGNOSIS — L90.5 SCAR CONDITIONS AND FIBROSIS OF SKIN: ICD-10-CM

## 2024-02-13 DIAGNOSIS — L57.8 SUN-DAMAGED SKIN: ICD-10-CM

## 2024-02-13 DIAGNOSIS — L60.3 NAIL DYSTROPHY: ICD-10-CM

## 2024-02-13 DIAGNOSIS — D18.01 CHERRY ANGIOMA: ICD-10-CM

## 2024-02-13 DIAGNOSIS — D22.9 MULTIPLE BENIGN MELANOCYTIC NEVI: ICD-10-CM

## 2024-02-13 DIAGNOSIS — Z85.828 HISTORY OF BASAL CELL CARCINOMA: ICD-10-CM

## 2024-02-13 DIAGNOSIS — Z85.89 HISTORY OF SQUAMOUS CELL CARCINOMA: ICD-10-CM

## 2024-02-13 DIAGNOSIS — D69.2 OTHER NONTHROMBOCYTOPENIC PURPURA (CMS-HCC): ICD-10-CM

## 2024-02-13 DIAGNOSIS — D48.5 NEOPLASM OF UNCERTAIN BEHAVIOR OF SKIN: Primary | ICD-10-CM

## 2024-02-13 DIAGNOSIS — L82.1 SEBORRHEIC KERATOSES: ICD-10-CM

## 2024-02-13 DIAGNOSIS — L57.0 ACTINIC KERATOSIS: ICD-10-CM

## 2024-02-13 PROCEDURE — 17003 DESTRUCT PREMALG LES 2-14: CPT | Performed by: DERMATOLOGY

## 2024-02-13 PROCEDURE — 88305 TISSUE EXAM BY PATHOLOGIST: CPT | Performed by: DERMATOLOGY

## 2024-02-13 PROCEDURE — 1159F MED LIST DOCD IN RCRD: CPT | Performed by: DERMATOLOGY

## 2024-02-13 PROCEDURE — 17000 DESTRUCT PREMALG LESION: CPT | Performed by: DERMATOLOGY

## 2024-02-13 PROCEDURE — 11102 TANGNTL BX SKIN SINGLE LES: CPT | Performed by: DERMATOLOGY

## 2024-02-13 PROCEDURE — 99214 OFFICE O/P EST MOD 30 MIN: CPT | Performed by: DERMATOLOGY

## 2024-02-13 PROCEDURE — 1125F AMNT PAIN NOTED PAIN PRSNT: CPT | Performed by: DERMATOLOGY

## 2024-02-13 PROCEDURE — 1036F TOBACCO NON-USER: CPT | Performed by: DERMATOLOGY

## 2024-02-13 RX ORDER — CICLOPIROX 80 MG/ML
SOLUTION TOPICAL DAILY
Qty: 6.6 ML | Refills: 11 | Status: SHIPPED | OUTPATIENT
Start: 2024-02-13

## 2024-02-13 ASSESSMENT — DERMATOLOGY QUALITY OF LIFE (QOL) ASSESSMENT
RATE HOW EMOTIONALLY BOTHERED YOU ARE BY YOUR SKIN PROBLEM (FOR EXAMPLE, WORRY, EMBARRASSMENT, FRUSTRATION): 1
RATE HOW EMOTIONALLY BOTHERED YOU ARE BY YOUR SKIN PROBLEM (FOR EXAMPLE, WORRY, EMBARRASSMENT, FRUSTRATION): 0 - NEVER BOTHERED
RATE HOW BOTHERED YOU ARE BY SYMPTOMS OF YOUR SKIN PROBLEM (EG, ITCHING, STINGING BURNING, HURTING OR SKIN IRRITATION): 2
WHAT SINGLE SKIN CONDITION LISTED BELOW IS THE PATIENT ANSWERING THE QUALITY-OF-LIFE ASSESSMENT QUESTIONS ABOUT: NONE OF THE ABOVE
WHAT SINGLE SKIN CONDITION LISTED BELOW IS THE PATIENT ANSWERING THE QUALITY-OF-LIFE ASSESSMENT QUESTIONS ABOUT: NONE OF THE ABOVE
ARE THERE EXCLUSIONS OR EXCEPTIONS FOR THE QUALITY OF LIFE ASSESSMENT: NO
DATE THE QUALITY-OF-LIFE ASSESSMENT WAS COMPLETED: 66883
RATE HOW EMOTIONALLY BOTHERED YOU ARE BY YOUR SKIN PROBLEM (FOR EXAMPLE, WORRY, EMBARRASSMENT, FRUSTRATION): 0 - NEVER BOTHERED
RATE HOW BOTHERED YOU ARE BY SYMPTOMS OF YOUR SKIN PROBLEM (EG, ITCHING, STINGING BURNING, HURTING OR SKIN IRRITATION): 1
RATE HOW BOTHERED YOU ARE BY EFFECTS OF YOUR SKIN PROBLEMS ON YOUR ACTIVITIES (EG, GOING OUT, ACCOMPLISHING WHAT YOU WANT, WORK ACTIVITIES OR YOUR RELATIONSHIPS WITH OTHERS): 0 - NEVER BOTHERED
WHAT SINGLE SKIN CONDITION LISTED BELOW IS THE PATIENT ANSWERING THE QUALITY-OF-LIFE ASSESSMENT QUESTIONS ABOUT: NONE OF THE ABOVE
WHAT SINGLE SKIN CONDITION LISTED BELOW IS THE PATIENT ANSWERING THE QUALITY-OF-LIFE ASSESSMENT QUESTIONS ABOUT: NONE OF THE ABOVE
RATE HOW BOTHERED YOU ARE BY EFFECTS OF YOUR SKIN PROBLEMS ON YOUR ACTIVITIES (EG, GOING OUT, ACCOMPLISHING WHAT YOU WANT, WORK ACTIVITIES OR YOUR RELATIONSHIPS WITH OTHERS): 1
RATE HOW BOTHERED YOU ARE BY EFFECTS OF YOUR SKIN PROBLEMS ON YOUR ACTIVITIES (EG, GOING OUT, ACCOMPLISHING WHAT YOU WANT, WORK ACTIVITIES OR YOUR RELATIONSHIPS WITH OTHERS): 0 - NEVER BOTHERED
DATE THE QUALITY-OF-LIFE ASSESSMENT WAS COMPLETED: 66883
DATE THE QUALITY-OF-LIFE ASSESSMENT WAS COMPLETED: 66883
RATE HOW BOTHERED YOU ARE BY SYMPTOMS OF YOUR SKIN PROBLEM (EG, ITCHING, STINGING BURNING, HURTING OR SKIN IRRITATION): 1

## 2024-02-13 ASSESSMENT — DERMATOLOGY PATIENT ASSESSMENT
HAVE YOU HAD OR DO YOU HAVE A STAPH INFECTION: NO
ARE YOU ON BIRTH CONTROL: NO
DO YOU HAVE ANY NEW OR CHANGING LESIONS: NO
DO YOU USE A TANNING BED: NO
ARE YOU AN ORGAN TRANSPLANT RECIPIENT: NO
ARE YOU TRYING TO GET PREGNANT: NO
HAVE YOU HAD OR DO YOU HAVE VASCULAR DISEASE: NO
DO YOU HAVE IRREGULAR MENSTRUAL CYCLES: NO

## 2024-02-13 ASSESSMENT — ITCH NUMERIC RATING SCALE
HOW SEVERE IS YOUR ITCHING?: 0
HOW SEVERE IS YOUR ITCHING?: 2

## 2024-02-13 ASSESSMENT — PATIENT GLOBAL ASSESSMENT (PGA)
PATIENT GLOBAL ASSESSMENT: PATIENT GLOBAL ASSESSMENT:  1 - CLEAR
PATIENT GLOBAL ASSESSMENT: PATIENT GLOBAL ASSESSMENT:  1 - CLEAR
WHAT IS THE PGA: PATIENT GLOBAL ASSESSMENT:  1 - CLEAR

## 2024-02-13 NOTE — PROGRESS NOTES
"Subjective     Maggie Bazan \"IRON\" is a 78 y.o. female who presents for the following: Skin Check (Face, red, itchy and dry.  Spot on back. Issue with fingernails.).     Skin Cancer History  No skin cancer on file.      Review of Systems:  No other skin or systemic complaints other than what is documented elsewhere in the note.    The following portions of the chart were reviewed this encounter and updated as appropriate:       Specialty Problems    None    Past Medical History:  Maggie Bazan \"IRON\"  has no past medical history on file.    Past Surgical History:  Maggie Bazan \"IRON\"  has a past surgical history that includes Other surgical history (04/20/2021); Other surgical history (04/20/2021); Other surgical history (04/20/2021); Other surgical history (05/07/2021); Other surgical history (05/07/2021); Tonsillectomy; Knee surgery (Right); and US guided thyroid biopsy (8/24/2023).    Family History:  Patient family history includes Breast cancer in her father's sister and father's sister; Heart attack in her mother's brother and mother's brother; Hyperlipidemia in her father and mother; Hypertension in her mother.    Social History:  Maggie Bazan \"IRON\"  reports that she quit smoking about 32 years ago. Her smoking use included cigarettes. She has a 20.00 pack-year smoking history. She has never used smokeless tobacco. She reports current alcohol use of about 5.0 - 6.0 standard drinks of alcohol per week. She reports that she does not use drugs.    Allergies:  Ezetimibe and Statins-hmg-coa reductase inhibitors    Current Medications / CAM's:    Current Outpatient Medications:     albuterol 90 mcg/actuation inhaler, Inhale 2 puffs 4 times a day as needed for wheezing or shortness of breath., Disp: 54 g, Rfl: 1    azelastine (Astelin) 137 mcg (0.1 %) nasal spray, Administer 1 spray into affected nostril(s) 2 times a day., Disp: , Rfl:     B complex-vitamin C-folic acid (Nephro-Korey Rx) 1- " mg-mg-mcg tablet, Take 1 tablet by mouth once daily with breakfast., Disp: , Rfl:     bempedoic acid (Nexletol) 180 mg tablet, Take 1 tablet by mouth once daily., Disp: , Rfl:     cholecalciferol (Vitamin D-3) 25 MCG (1000 UT) tablet, Take 1 tablet (25 mcg) by mouth once daily., Disp: , Rfl:     ciclopirox (Penlac) 8 % solution, Apply topically once daily. To affected nail(s) daily x 48 weeks. Once per week clean the area with rubbing alcohol, Disp: 6.6 mL, Rfl: 11    coenzyme Q-10 100 mg capsule, Take by mouth once daily., Disp: , Rfl:     evolocumab (Repatha SureClick) 140 mg/mL injection, Inject 1 mL (140 mg) under the skin every 14 (fourteen) days. (Patient not taking: Reported on 1/9/2024), Disp: 2 each, Rfl: 12    famotidine (Pepcid) 20 mg tablet, Take 1 tablet (20 mg) by mouth once daily as needed., Disp: , Rfl:     fexofenadine (Allegra) 180 mg tablet, Take 1 tablet (180 mg) by mouth once daily., Disp: , Rfl:     fish oil concentrate (Omega-3) 120-180 mg capsule, Take by mouth once daily., Disp: , Rfl:     fluticasone furoate-vilanteroL (Breo Ellipta) 200-25 mcg/dose inhaler, Inhale 1 puff once daily., Disp: , Rfl:     ibuprofen 600 mg tablet, Take 1 tablet (600 mg) by mouth every 6 hours if needed., Disp: , Rfl:     isosorbide mononitrate ER (Imdur) 60 mg 24 hr tablet, TAKE 1 TABLET BY MOUTH DAILY, Disp: 90 tablet, Rfl: 3    L. acidophilus/Bifid. animalis 32 billion cell capsule, Take by mouth once daily as needed., Disp: , Rfl:     levothyroxine (Synthroid, Levoxyl) 75 mcg tablet, Take 1 tablet (75 mcg) by mouth once daily., Disp: 90 tablet, Rfl: 1    montelukast (Singulair) 10 mg tablet, Take 1 tablet (10 mg) by mouth once daily., Disp: 90 tablet, Rfl: 1    multivit-min-folic acid-biotin (Hair,Skin and Nails,FA-biotin,) 133.3 mcg- 1,666.7 mcg capsule, Take by mouth once daily., Disp: , Rfl:     propranolol (Inderal) 10 mg tablet, Take by mouth., Disp: , Rfl:     verapamil ER (Veralan PM) 180 mg 24 hr  capsule, Take 1 capsule (180 mg) by mouth once daily at bedtime., Disp: 90 capsule, Rfl: 1     Objective   Well appearing patient in no apparent distress; mood and affect are within normal limits.    A full examination was performed including scalp, head, eyes, ears, nose, lips, neck, chest, axillae, abdomen, back, buttocks, bilateral upper extremities, bilateral lower extremities, hands, feet, fingers, toes, fingernails, and toenails. All findings within normal limits unless otherwise noted below. Patient declined genital and gluteal cleft exam.      Fingernails x 10 with distal white nail plate discoloration, mild lifting and mild subungual debris    - scattered tan macules, telangiectasias, and general photo-damage    - Well healed scar at prior treatment sites without visual or palpable evidence of recurrence.     - Scattered waxy tan/grey/brown papules with horn cysts    - scattered small bright red papules and macules    - scattered regular brown macules and papules    Left 4th Finger Distal Interphalangeal Joint  Ill defined keratotic pink papule approx 8-10mm          Head - Anterior (Face) (3), Left Dorsal Hand, Left Upper Back, Right Dorsal Hand  Erythematous macules with gritty scale.    Left Forearm - Anterior, Left Lower Leg - Anterior, Right Forearm - Anterior, Right Lower Leg - Anterior  Actinic purpura         Assessment/Plan   Neoplasm of uncertain behavior of skin  Left 4th Finger Distal Interphalangeal Joint    Lesion biopsy  Type of biopsy: tangential    Informed consent: discussed and consent obtained    Timeout: patient name, date of birth, surgical site, and procedure verified    Procedure prep:  Patient was prepped and draped  Anesthesia: the lesion was anesthetized in a standard fashion    Anesthetic:  1% lidocaine w/ epinephrine 1-100,000 local infiltration  Instrument used: DermaBlade    Hemostasis achieved with: aluminum chloride    Outcome: patient tolerated procedure well     Post-procedure details: sterile dressing applied and wound care instructions given    Dressing type: petrolatum and bandage      Staff Communication: Dermatology Local Anesthesia: 1 % Lidocaine / Epinephrine - Amount: 1mL    Specimen 1 - Dermatopathology- DERM LAB  Differential Diagnosis: scc vs hak  Check Margins Yes/No?:    Comments:    Dermpath Lab: Routine Histopathology (formalin-fixed tissue)    Shave biopsy- favor squamous cell carcinoma > hypertrophic actinic keratosis     Actinic keratosis (6)  Head - Anterior (Face) (3); Left Upper Back; Left Dorsal Hand; Right Dorsal Hand    - The premalignant nature of the disorder was reviewed and treatment options were reviewed.   - Patient agreeable to treatment with cryotherapy today.  Sites confirmed. Risks and benefits reviewed including but not limited to pain, redness, swelling, blister, scab, healing with hypo or hyperpigmentation, and scar. Chance of recurrence or persistence reviewed.      Destr of lesion - Head - Anterior (Face), Left Dorsal Hand, Left Upper Back, Right Dorsal Hand  Complexity: simple    Destruction method: cryotherapy    Informed consent: discussed and consent obtained    Lesion destroyed using liquid nitrogen: Yes    Cryotherapy cycles:  1  Outcome: patient tolerated procedure well with no complications    Post-procedure details: wound care instructions given      Related Procedures  Follow Up In Dermatology - Established Patient    Nail dystrophy    Favor onychomycosis, discussed with patient,  - asymptomatic, she declines nail culture and systemic options  - will treat with ciclopirox nail solution daily x 48 weeks, Rx sent, application technique reviewed.   - low cure rate reviewed    ciclopirox (Penlac) 8 % solution  Apply topically once daily. To affected nail(s) daily x 48 weeks. Once per week clean the area with rubbing alcohol    Sun-damaged skin    Actinically damaged skin-  - Sun protective behavior reviewed and encouraged  including the use of over-the-counter sunscreen with SPF30+ daily (reapply every 1.5 hours when outdoors), UPF clothing, broad rimmed hats, sunglasses, and avoidance of midday sun. Home skin monitoring encouraged and how to monitor for skin cancer (changing or new moles, new rapidly growing or non-healing lesions) reviewed. Patient encouraged to call with interval concerns or changes.      Scar conditions and fibrosis of skin    - Well healed scar(s) at sites of prior skin cancer basal cell carcinoma and Invasive squamous cell carcinoma   - Sun protective behavior reviewed and encouraged including the use of over-the-counter sunscreen with SPF30+ daily (reapply every 1.5 hours when outdoors), UPF clothing, broad rimmed hats, sunglasses, and avoidance of midday sun. Home skin monitoring encouraged and how to monitor for skin cancer (changing or new moles, new rapidly growing or non-healing lesions) reviewed. Patient encouraged to call with interval concerns or changes.       Seborrheic keratoses    Seborrheic keratosis (-es)  - Discussed benign nature and that no treatment is necessary unless it becomes painful or increases in size. Patient opts for clinical monitoring at this time.      Cherry angioma    Cherry angioma(s)  - Discussed benign nature and that no treatment is necessary unless it becomes painful or increases in size. Patient opts for clinical monitoring at this time.      Multiple benign melanocytic nevi    Benign melanocytic nevi  - Discussed benign nature and that no treatment is necessary unless it becomes painful or increases in size. Patient opts for clinical monitoring at this time.    - Sun protective behavior reviewed and encouraged including the use of over-the-counter sunscreen with SPF30+ daily (reapply every 1.5 hours when outdoors), UPF clothing, broad rimmed hats, sunglasses, and avoidance of midday sun. Home skin monitoring encouraged and how to monitor for skin cancer (changing or new  moles, new rapidly growing or non-healing lesions) reviewed. Patient encouraged to call with interval concerns or changes.      History of basal cell carcinoma    History of squamous cell carcinoma    Other nonthrombocytopenic purpura (CMS/HCC) (4)  Left Forearm - Anterior; Right Forearm - Anterior; Left Lower Leg - Anterior; Right Lower Leg - Anterior    Actinic purpura  - nature reviewed  - Recommended arnica based cream at last visit, encouraged daily use to arms and legs.

## 2024-02-15 LAB
LABORATORY COMMENT REPORT: NORMAL
PATH REPORT.FINAL DX SPEC: NORMAL
PATH REPORT.GROSS SPEC: NORMAL
PATH REPORT.MICROSCOPIC SPEC OTHER STN: NORMAL
PATH REPORT.RELEVANT HX SPEC: NORMAL
PATH REPORT.TOTAL CANCER: NORMAL

## 2024-02-26 DIAGNOSIS — I10 PRIMARY HYPERTENSION: ICD-10-CM

## 2024-02-26 DIAGNOSIS — E78.5 DYSLIPIDEMIA: ICD-10-CM

## 2024-02-26 DIAGNOSIS — I25.10 CORONARY ARTERY DISEASE INVOLVING NATIVE CORONARY ARTERY OF NATIVE HEART, UNSPECIFIED WHETHER ANGINA PRESENT: ICD-10-CM

## 2024-02-26 DIAGNOSIS — Z78.9 STATIN INTOLERANCE: ICD-10-CM

## 2024-02-26 DIAGNOSIS — I50.32 CHRONIC DIASTOLIC HEART FAILURE (MULTI): ICD-10-CM

## 2024-02-26 RX ORDER — EVOLOCUMAB 140 MG/ML
140 INJECTION, SOLUTION SUBCUTANEOUS
Qty: 2 EACH | Refills: 12 | Status: SHIPPED | OUTPATIENT
Start: 2024-02-26

## 2024-02-29 DIAGNOSIS — M54.2 CERVICALGIA OF OCCIPITO-ATLANTO-AXIAL REGION: Primary | ICD-10-CM

## 2024-03-14 ENCOUNTER — APPOINTMENT (OUTPATIENT)
Dept: PHYSICAL THERAPY | Facility: CLINIC | Age: 79
End: 2024-03-14
Payer: MEDICARE

## 2024-03-21 ENCOUNTER — APPOINTMENT (OUTPATIENT)
Dept: PULMONOLOGY | Facility: HOSPITAL | Age: 79
End: 2024-03-21
Payer: MEDICARE

## 2024-04-01 ENCOUNTER — APPOINTMENT (OUTPATIENT)
Dept: PULMONOLOGY | Facility: HOSPITAL | Age: 79
End: 2024-04-01
Payer: MEDICARE

## 2024-04-09 ENCOUNTER — OFFICE VISIT (OUTPATIENT)
Dept: PULMONOLOGY | Facility: HOSPITAL | Age: 79
End: 2024-04-09
Payer: MEDICARE

## 2024-04-09 ENCOUNTER — EVALUATION (OUTPATIENT)
Dept: PHYSICAL THERAPY | Facility: CLINIC | Age: 79
End: 2024-04-09
Payer: MEDICARE

## 2024-04-09 VITALS
OXYGEN SATURATION: 98 % | SYSTOLIC BLOOD PRESSURE: 149 MMHG | HEIGHT: 65 IN | RESPIRATION RATE: 16 BRPM | BODY MASS INDEX: 23.94 KG/M2 | HEART RATE: 60 BPM | WEIGHT: 143.7 LBS | DIASTOLIC BLOOD PRESSURE: 82 MMHG

## 2024-04-09 DIAGNOSIS — J30.9 ALLERGIC RHINITIS, UNSPECIFIED SEASONALITY, UNSPECIFIED TRIGGER: ICD-10-CM

## 2024-04-09 DIAGNOSIS — M54.2 CERVICALGIA OF OCCIPITO-ATLANTO-AXIAL REGION: Primary | ICD-10-CM

## 2024-04-09 DIAGNOSIS — J45.20 MILD INTERMITTENT ASTHMA WITHOUT COMPLICATION (HHS-HCC): Primary | ICD-10-CM

## 2024-04-09 PROCEDURE — 97161 PT EVAL LOW COMPLEX 20 MIN: CPT | Mod: GP

## 2024-04-09 PROCEDURE — 1159F MED LIST DOCD IN RCRD: CPT | Performed by: NURSE PRACTITIONER

## 2024-04-09 PROCEDURE — 97110 THERAPEUTIC EXERCISES: CPT | Mod: GP

## 2024-04-09 PROCEDURE — 1160F RVW MEDS BY RX/DR IN RCRD: CPT | Performed by: NURSE PRACTITIONER

## 2024-04-09 PROCEDURE — 99213 OFFICE O/P EST LOW 20 MIN: CPT | Performed by: NURSE PRACTITIONER

## 2024-04-09 PROCEDURE — 1036F TOBACCO NON-USER: CPT | Performed by: NURSE PRACTITIONER

## 2024-04-09 PROCEDURE — 99213 OFFICE O/P EST LOW 20 MIN: CPT | Mod: ZK | Performed by: NURSE PRACTITIONER

## 2024-04-09 PROCEDURE — 3077F SYST BP >= 140 MM HG: CPT | Performed by: NURSE PRACTITIONER

## 2024-04-09 PROCEDURE — 3079F DIAST BP 80-89 MM HG: CPT | Performed by: NURSE PRACTITIONER

## 2024-04-09 ASSESSMENT — ENCOUNTER SYMPTOMS
FATIGUE: 0
CHILLS: 0
LOSS OF SENSATION IN FEET: 0
OCCASIONAL FEELINGS OF UNSTEADINESS: 0
UNEXPECTED WEIGHT CHANGE: 0
COUGH: 0
RHINORRHEA: 0
WHEEZING: 0
FEVER: 0
DEPRESSION: 0
SHORTNESS OF BREATH: 1

## 2024-04-09 ASSESSMENT — PAIN - FUNCTIONAL ASSESSMENT: PAIN_FUNCTIONAL_ASSESSMENT: 0-10

## 2024-04-09 ASSESSMENT — PAIN SCALES - GENERAL: PAINLEVEL_OUTOF10: 2

## 2024-04-09 NOTE — PATIENT INSTRUCTIONS
Continue Breo one puff once a day, everyday.   Continue to use albuterol as needed.   Continue Azelastine and Flonase nasal sprays as discussed.   Continue on Allegra and montelukast.   Call with any questions or concerns.  Follow up with me in one year.

## 2024-04-09 NOTE — PROGRESS NOTES
"Subjective   Patient ID: Maggie Bazan \"IRON\" is a 78 y.o. female who presents for     HPI: Patient has  PMH of CAD, chronic diastolic heart failure, HTN, DLD, allergic rhinitis, hypothyroidism, and childhood asthma. She was referred by cardiology for shortness of breath on exertion. She states that she did have a sarcoid removed from her lung about 30 years ago but has not had any issues since. She does use albuterol a few times per week which helps. She takes Montelukast and Flonase daily for allergic rhinitis which helps but she states she still has congestion. She does have a daily dry cough, worse in the morning. She will hear herself wheezing frequently. She denies any fever, chills, chest pain, leg swelling, or hemoptysis. She is a former smoker at 1 ppd for about 15 years, she quit in 1992. She denies any occupational exposures or [place] a farm and having barn animals.      Today she is here for follow up. She is still doing well on Breo and not needing to use albuterol. She states allergies are currently under control also. She has no other concerns for today.     Review of Systems   Constitutional:  Negative for chills, fatigue, fever and unexpected weight change.   HENT:  Positive for congestion. Negative for postnasal drip and rhinorrhea.    Respiratory:  Positive for shortness of breath. Negative for cough (denies hemoptysis.) and wheezing.    Cardiovascular:  Negative for chest pain and leg swelling.   All other systems reviewed and are negative.      Objective   Physical Exam  Vitals reviewed.   Constitutional:       Appearance: Normal appearance.   HENT:      Head: Normocephalic.   Cardiovascular:      Rate and Rhythm: Normal rate and regular rhythm.   Pulmonary:      Effort: Pulmonary effort is normal.      Breath sounds: Decreased breath sounds present.   Skin:     General: Skin is warm and dry.   Neurological:      Mental Status: She is alert.         Assessment/Plan      1. Bronchial " asthma  2. Allergic rhinitis  3. History of Sarcoidosis of the lung   4. GERD  5. Chronic diastolic heart failure  6. Former smoker, quit 1992     Plan:     -I reviewed her CXR with areas of scarring/fibrosis, worse on the left. She reports a history of sarcoidosis of her lung that was biopsied about 30 years ago, this is likely scarring secondary to this. PFTs were done with only mild restriction TLC was 71, will defer CT chest at this time.   -Continue Breo one puff once a day. Offered to try to come off of Breo but she states she has been controlled with this, she would like to continue.   -Continue albuterol prn, she does not use this often.   -, RAST negative, Eos 180.  -Continue Azelastine, continue Flonase.   -Continue Allegra and Montelukast.  -GERD symptoms are controlled with famotidine.   -She is a former smoker at 1 ppd for 15 years, quit in 1992.  -She is following closely with cardiology.      Overall her breathing is stable on current regimen. I will bring her back with me in one year but instructed her to call sooner if needed.

## 2024-04-09 NOTE — PROGRESS NOTES
"Physical Therapy    Physical Therapy Evaluation and Treatment      Patient Name: Maggie Bazan \"IRON\"  MRN: [id number]  Today's Date: 4/9/2024  Time Calculation  Start Time: 1115  Stop Time: 1200  Time Calculation (min): 45 min    Assessment:  PT Assessment  PT Assessment Results: Decreased range of motion, Decreased mobility, Pain  Rehab Prognosis: Excellent   The patient is an 78 year old female presenting to PT with neck pain & decreased cervical ROM.  The patient will benefit from skilled intervention to address above deficits & progress toward meeting goals.      Plan:  OP PT Plan  Treatment/Interventions: Cryotherapy, Education/ Instruction, Hot pack, Manual therapy, Therapeutic exercises, Mechanical traction  PT Plan: Skilled PT  PT Frequency: 1 time per week  Rehab Potential: Excellent  Plan of Care Agreement: Patient    Current Problem:   1. Cervicalgia of aqfvccgl-txelccg-aweyg region  Referral to Physical Therapy    Follow Up In Physical Therapy          Subjective    General:  General  Reason for Referral: cervicalgia  Referred By: Star Beauchamp MD  Past Medical History Relevant to Rehab: reviewed  The patient she was kicked in the head by a soccer ball in July and has been experiencing neck pain ever since.  Pain ranges from 0-5/10.  CT Aug 23' (-) for fractures/subluxation.  The patient's goal is to decrease neck pain with all activities.      Precautions:  Precautions  STEADI Fall Risk Score (The score of 4 or more indicates an increased risk of falling): 1  Precautions Comment: none     Pain:  Pain Assessment  Pain Assessment: 0-10  Pain Score: 2  Pain Type: Chronic pain  Pain Location: Neck  Pain Orientation: Right     Prior Level of Function:  Prior Function Per Pt/Caregiver Report  Level of Castro: Independent with ADLs and functional transfers    Objective   Cervical AROM (degrees)  FB = 38  BB = 36   R SB = 19  L SB = 25  R Rot = 42  L Rot = 38    Outcome Measures:  Other Measures  Neck " "Disability Index: 6     Treatments:  Cervical HP - 10'  Chin Tucks - x10 10\"H  Cervical rotation stretch - x3 ea 30\"H  Cervical SB stretch - x3 ea 30\"H     EDUCATION:   HEP    Goals: (By week 8)  1) Decrease neck pain with all activities <2/10    2) Increase cervical SB/Rot AROM by 5 degrees to ease capability to perform ADLs              "

## 2024-04-16 ENCOUNTER — TREATMENT (OUTPATIENT)
Dept: PHYSICAL THERAPY | Facility: CLINIC | Age: 79
End: 2024-04-16
Payer: MEDICARE

## 2024-04-16 DIAGNOSIS — M54.2 CERVICALGIA OF OCCIPITO-ATLANTO-AXIAL REGION: ICD-10-CM

## 2024-04-16 PROCEDURE — 97110 THERAPEUTIC EXERCISES: CPT | Mod: GP,CQ

## 2024-04-16 PROCEDURE — 97010 HOT OR COLD PACKS THERAPY: CPT | Mod: GP,CQ

## 2024-04-16 PROCEDURE — 97012 MECHANICAL TRACTION THERAPY: CPT | Mod: GP,CQ

## 2024-04-16 ASSESSMENT — PAIN - FUNCTIONAL ASSESSMENT: PAIN_FUNCTIONAL_ASSESSMENT: 0-10

## 2024-04-16 NOTE — PROGRESS NOTES
"Physical Therapy    Physical Therapy Treatment    Patient Name: Maggie Bazan  MRN: [id number]  : 1945   Today's Date: 2024  Time Calculation  Start Time: 940  Stop Time: 1030  Time Calculation (min): 50 min  PT Therapeutic Procedures Time Entry  Therapeutic Exercise Time Entry: 40  PT Modalities Time Entry  Hot/Cold Pack Time Entry: 10 (HP)  Visit #2            Current Problem  1. Cervicalgia of rvupxrjn-nmcesim-nxlgo region  Follow Up In Physical Therapy            Subjective   Pt reported she had massage over the weekend and upper cervical feels a lot better but she feels other muscles may be sore today. She is only feeling pain with rotation today. She is working on HEP.       Precautions  Precautions  STEADI Fall Risk Score (The score of 4 or more indicates an increased risk of falling): 1  Precautions Comment: none       Pain  Pain Assessment: 0-10  Pain Score:  (0-3/10 movement)  Pain Type: Chronic pain  Pain Location: Neck  Pain Orientation: Right    Objective    Reviewed HEP  Added pulleys, cervical isometrics, mech traction.    Treatments:  EXERCISES Date 24 Date Date Date    REPS REPS REPS REPS          HP 10'             Chin tucks 10x5\"H      Cervical rotation stretch 3x30\" ea      Cervical SB stretch 3x30\" ea             Pulleys: flex 3'             Cervical isometrics (6) 10x3\"H  ea             Tband: rows 2x10 green      Tband: pull downs 2x10 blue                           Mechanical traction 17#/5# 30/10 15'                                                                                                 HEP            Assessment:  Pt had some difficulty with cervical retraction but was able to complete with visual cues. No increase in symptoms with exercises today. Pain remained 0-3/10 at end of session.       Plan:   Assess patient's response to first treatment.    OP EDUCATION:       Goals:  1) Decrease neck pain with all activities <2/10    2) Increase cervical SB/Rot AROM " by 5 degrees to ease capability to perform ADLs

## 2024-04-22 ENCOUNTER — APPOINTMENT (OUTPATIENT)
Dept: PHYSICAL THERAPY | Facility: CLINIC | Age: 79
End: 2024-04-22
Payer: MEDICARE

## 2024-04-22 DIAGNOSIS — I25.10 CORONARY ARTERY DISEASE INVOLVING NATIVE CORONARY ARTERY OF NATIVE HEART, UNSPECIFIED WHETHER ANGINA PRESENT: ICD-10-CM

## 2024-04-22 DIAGNOSIS — E78.5 DYSLIPIDEMIA: Primary | ICD-10-CM

## 2024-04-22 RX ORDER — BEMPEDOIC ACID 180 MG/1
1 TABLET, FILM COATED ORAL DAILY
Qty: 90 TABLET | Refills: 0 | Status: SHIPPED | OUTPATIENT
Start: 2024-04-22

## 2024-04-23 ENCOUNTER — TREATMENT (OUTPATIENT)
Dept: PHYSICAL THERAPY | Facility: CLINIC | Age: 79
End: 2024-04-23
Payer: MEDICARE

## 2024-04-23 DIAGNOSIS — M54.2 CERVICALGIA OF OCCIPITO-ATLANTO-AXIAL REGION: Primary | ICD-10-CM

## 2024-04-23 PROCEDURE — 97110 THERAPEUTIC EXERCISES: CPT | Mod: GP

## 2024-04-23 PROCEDURE — 97140 MANUAL THERAPY 1/> REGIONS: CPT | Mod: GP

## 2024-04-23 ASSESSMENT — PAIN SCALES - GENERAL: PAINLEVEL_OUTOF10: 3

## 2024-04-23 ASSESSMENT — PAIN - FUNCTIONAL ASSESSMENT: PAIN_FUNCTIONAL_ASSESSMENT: 0-10

## 2024-04-23 NOTE — PROGRESS NOTES
"Physical Therapy    Physical Therapy Treatment    Patient Name: Maggie Bazan  MRN: [id number]  : 1945   Today's Date: 2024  Time Calculation  Start Time: 1030  Stop Time: 1110  Time Calculation (min): 40 min  PT Therapeutic Procedures Time Entry  Manual Therapy Time Entry: 10  Therapeutic Exercise Time Entry: 30     Visit #3    Current Problem  1. Cervicalgia of nkepmygi-dzgvqrp-nwwve region  Follow Up In Physical Therapy        Subjective   The patient reports compliance with HEP 2x/day.       Precautions  Precautions  Precautions Comment: none       Pain  Pain Assessment: 0-10  Pain Score: 3  Pain Location: Neck  Pain Orientation: Right    Objective   Cervical AROM (degrees)   R Rot = 42  L Rot = 38    Treatments:  EXERCISES Date 24 Date 24 Date Date    REPS REPS REPS REPS          HP 10' Cervical 10'            Chin tucks 10x5\"H 10x 5\"H     Cervical rotation stretch 3x30\" ea 3x 30\"H     Cervical SB stretch 3x30\" ea 3x 30\"H            Pulleys: flex 3'             Cervical isometrics (6) 10x3\"H  ea 10x 3\"H            Tband: rows 2x10 green Green x20     Tband: pull downs 2x10 blue Green x20     Tband skis  Green x20                   Mechanical traction 17#/5# 30/10 15' -----            Suboccipital release/TPR cervical paraspinals  10'            SPI  5'                                                                    HEP          Assessment:  The patient reports experiencing 0/10 neck pain following treatment.         Plan:   Treatment/Interventions: Cryotherapy, Education/ Instruction, Hot pack, Manual therapy, Therapeutic exercises, Mechanical traction      Goals:  1) Decrease neck pain with all activities <2/10 -progressing    2) Increase cervical SB/Rot AROM by 5 degrees to ease capability to perform ADLs  "

## 2024-04-30 ENCOUNTER — TREATMENT (OUTPATIENT)
Dept: PHYSICAL THERAPY | Facility: CLINIC | Age: 79
End: 2024-04-30
Payer: MEDICARE

## 2024-04-30 DIAGNOSIS — M54.2 CERVICALGIA OF OCCIPITO-ATLANTO-AXIAL REGION: Primary | ICD-10-CM

## 2024-04-30 PROCEDURE — 97140 MANUAL THERAPY 1/> REGIONS: CPT | Mod: GP

## 2024-04-30 PROCEDURE — 97110 THERAPEUTIC EXERCISES: CPT | Mod: GP

## 2024-04-30 ASSESSMENT — PAIN - FUNCTIONAL ASSESSMENT: PAIN_FUNCTIONAL_ASSESSMENT: 0-10

## 2024-04-30 ASSESSMENT — PAIN SCALES - GENERAL: PAINLEVEL_OUTOF10: 2

## 2024-04-30 NOTE — PROGRESS NOTES
"Physical Therapy    Physical Therapy Treatment    Patient Name: Maggie Bazan  MRN: [id number]  : 1945   Today's Date: 2024  Time Calculation  Start Time: 1130  Stop Time: 1210  Time Calculation (min): 40 min  PT Therapeutic Procedures Time Entry  Manual Therapy Time Entry: 10  Therapeutic Exercise Time Entry: 20     Visit #4    Current Problem  1. Cervicalgia of pmauhbfe-qkanpjm-nbhwf region  Follow Up In Physical Therapy        Subjective   The patient has noticed her cervical ROM has improved while driving.       Precautions  Precautions  Precautions Comment: none     Pain  Pain Assessment: 0-10  Pain Score: 2  Pain Location: Neck  Pain Orientation: Right    Objective   Cervical AROM (degrees)   R Rot = 42  L Rot = 38    Treatments:  EXERCISES Date 24 Date 24 Date 24 Date    REPS REPS REPS REPS          HP 10' Cervical 10' 10'           Chin tucks 10x5\"H 10x 5\"H HEP    Cervical rotation stretch 3x30\" ea 3x 30\"H HEP    Cervical SB stretch 3x30\" ea 3x 30\"H HEP           Pulleys: flex 3'             Cervical isometrics (6) 10x3\"H  ea 10x 3\"H 10x 3\"H           Tband: rows 2x10 green Green x20 Green x20    Tband: pull downs 2x10 blue Green x20 Green x20    Tband skis  Green x20 Green x20                  Mechanical traction 17#/5# 30/10 15' -----            Suboccipital release/TPR cervical paraspinals  10' 10'           SPI  5' 5'           Theracane    3'                                                     HEP          Assessment:  The patient reports her neck pain decreased to 1/10 following treatment.         Plan:   Treatment/Interventions: Cryotherapy, Education/ Instruction, Hot pack, Manual therapy, Therapeutic exercises, Mechanical traction      Goals:  1) Decrease neck pain with all activities <2/10 -progressing    2) Increase cervical SB/Rot AROM by 5 degrees to ease capability to perform ADLs  "

## 2024-05-07 ENCOUNTER — TREATMENT (OUTPATIENT)
Dept: PHYSICAL THERAPY | Facility: CLINIC | Age: 79
End: 2024-05-07
Payer: MEDICARE

## 2024-05-07 DIAGNOSIS — M54.2 CERVICALGIA OF OCCIPITO-ATLANTO-AXIAL REGION: Primary | ICD-10-CM

## 2024-05-07 PROCEDURE — 97110 THERAPEUTIC EXERCISES: CPT | Mod: GP

## 2024-05-07 ASSESSMENT — PAIN SCALES - GENERAL: PAINLEVEL_OUTOF10: 4

## 2024-05-07 ASSESSMENT — PAIN - FUNCTIONAL ASSESSMENT: PAIN_FUNCTIONAL_ASSESSMENT: 0-10

## 2024-05-07 NOTE — PROGRESS NOTES
"Physical Therapy    Physical Therapy Treatment (discharge)    Patient Name: Maggie Bazan  MRN: [id number]  : 1945   Today's Date: 2024  Time Calculation  Start Time: 1400  Stop Time: 1435  Time Calculation (min): 35 min  PT Therapeutic Procedures Time Entry  Therapeutic Exercise Time Entry: 25     Visit #5    Current Problem  1. Cervicalgia of tgblrhla-hgnnwwq-gyuka region            Subjective   The patient reports her R neck pain has been exacerbated the past 2 days possibly d/t driving to and from [place].       Precautions  Precautions  Precautions Comment: none     Pain  Pain Assessment: 0-10  Pain Score: 4  Pain Location: Neck  Pain Orientation: Right    Objective   Cervical AROM (degrees)  FB = 38  BB = 32  R SB = 27  L SB =  27   R Rot = 37  L Rot = 48    NDI = 14    Treatments:  EXERCISES Date 24 Date 24 Date 24 Date 24    REPS REPS REPS REPS       Discharge 25'   HP 10' Cervical 10' 10' 10'          Chin tucks 10x5\"H 10x 5\"H HEP    Cervical rotation stretch 3x30\" ea 3x 30\"H HEP    Cervical SB stretch 3x30\" ea 3x 30\"H HEP           Pulleys: flex 3'             Cervical isometrics (6) 10x3\"H  ea 10x 3\"H 10x 3\"H           Tband: rows 2x10 green Green x20 Green x20    Tband: pull downs 2x10 blue Green x20 Green x20    Tband skis  Green x20 Green x20                  Mechanical traction 17#/5# 30/10 15' -----            Suboccipital release/TPR cervical paraspinals  10' 10'           SPI  5' 5'           Theracane    3'                                                     HEP          Assessment:   The patient has achieved max therapy benefits and will discharge.      Plan:   Discharge from PT today.  Thank you for your referral.     Goals:  1) Decrease neck pain with all activities <2/10 -partially met    2) Increase cervical SB/Rot AROM by 5 degrees to ease capability to perform ADLs - partially met  "

## 2024-06-16 NOTE — PROGRESS NOTES
[facility] Heart and Vascular Cardiology    Patient Name: Maggie Bazan  Patient : 1945      Scribe Attestation  By signing my name below, IViviana Scribgiana   attest that this documentation has been prepared under the direction and in the presence of Enrique Esquivel DO.      Reason for visit:  This is an 78-year-old female here for follow-up regarding coronary artery disease as seen on cardiac catheterization done in 2017, chronic diastolic heart failure, hypertension, and dyslipidemia with reported statin/Zetia intolerance.    HPI:  This is an 78-year-old female here for follow-up regarding coronary artery disease as seen on cardiac catheterization done in 2017, chronic diastolic heart failure, hypertension, and dyslipidemia with reported statin/Zetia intolerance.  The patient was last evaluated by me in 2023.  At that visit I ordered Repatha 140 mg subcu every 2 weeks, ordered blood work including CMP/lipid/magnesium/CBC/BNP to be drawn in 6 months, and asked the patient to follow-up in 6 months and sooner if necessary. CMP done in 2024 showed normal serum sodium and potassium and a serum creatinine of 1.16. Normal ALT and AST. Serum magnesium 2.11 and . TSH was 0.92. CBC showed a hemoglobin of 14.1. Lipid panel done in 2024 showed an LDL cholesterol of 25 and triglycerides of 73 currently on bempedoic acid 180 mg daily and  Repatha 140 mg subcutaneous every 2 weeks. ECG done today showed sinus bradycardia with sinus arrhythmia with a  heart rate of 58 bpm.  The patient reports intermittent dizziness when quickly changing positions. She denies any new chest pain, shortness of breath, and palpitations. She states that she takes all of her medications as prescribed. During my exam, she was resting comfortably on the exam table.        Assessment/Plan:   1. Coronary artery disease  The patient has a reported history of history of coronary artery  disease as seen on cardiac catheterization done in June 2017.  She also had a history of vasospasm and is on Imdur and verapamil for symptom control.  Nuclear stress done 4/27/2022 showed a small partially reversible perfusion defect in the apical wall which resolved on prone imaging consistent with soft tissue attenuation, low probability of ischemia, calculated ejection fraction 64%.  ECG done today showed sinus bradycardia with sinus arrhythmia with a  heart rate of 58 bpm.    She denies anginal chest discomfort.   Blood pressure appears controlled on exam today.  She should continue her current antihypertensive medications.  Recent lab works as noted in the HPI.   Lipid panel done in June 2024 showed an LDL cholesterol of 25 and triglycerides of 73 currently on bempedoic acid 180 mg daily and  Repatha 140 mg subcutaneous every 2 weeks.   Lab works as noted below will be done in 6 months prior to his next visit.   Please see lifestyle recommendations below.  Follow up in 6 months and sooner if necessary.      2. Chronic diastolic heart failure  The patient has a history of chronic diastolic heart failure.  Nuclear stress done 4/27/2022 showed a small partially reversible perfusion defect in the apical wall which resolved on prone imaging consistent with soft tissue attenuation, low probability of ischemia, calculated ejection fraction 64%.  Outside echocardiogram done 3/8/2021 showed normal left ventricular systolic function with an ejection fraction of 62%, left ventricular diastolic function, normal right ventricular systolic function, mild mitral and tricuspid valve regurgitation.  She does not appear significantly volume overloaded on physical exam.  She should continue current cardiac medications.   Recent lab works as noted in the HPI.   Lab works as noted below will be done in 6 months prior to his next visit.  I discussed with her the importance of following a low-sodium heart healthy diet.  Follow up in  6 months and sooner if necessary.      3. Hypertension  The patient has a history of hypertension and blood pressure appears controlled on exam today.   She should continue her current antihypertensive medications.      4. Dyslipidemia/Statin and Zetia intolerance  The patient has a history of dyslipidemia with reported statin/Zetia intolerance.  Lipid panel done in June 2024 showed an LDL cholesterol of 25 and triglycerides of 73 currently on bempedoic acid 180 mg daily and  Repatha 140 mg subcutaneous every 2 weeks.   Please see lifestyle recommendations below.     5. Lightheadedness  The patient reports intermittent lightheadedness when quickly changing positions.   Blood pressure appears controlled on exam today.  Patient should follow general recommendations including staying well-hydrated, changing the positions slowly, wearing compression stockings as necessary, and routine exercise.       Orders:   BMP/BNP/magnesium in 6 months,   Follow-up in 6 months.    Lifestyle Recommendations  I recommend a whole-food plant-based diet, an eating pattern that encourages the consumption of unrefined plant foods (such as fruits, vegetables, tubers, whole grains, legumes, nuts and seeds) and discourages meats, dairy products, eggs and processed foods.     The AHA/ACC recommends that the patient consume a dietary pattern that emphasizes intake of vegetables, fruits, and whole grains; includes low-fat dairy products, poultry, fish, legumes, non-tropical vegetable oils, and nuts; and limits intake of sodium, sweets, sugar-sweetened beverages, and red meats.  Adapt this dietary pattern to appropriate calorie requirements (a 500-750 kcal/day deficit to loose weight), personal and cultural food preferences, and nutrition therapy for other medical conditions (including diabetes).  Achieve this pattern by following plans such as the [place] Mediterranean, DASH dietary pattern, or AHA diet.     Engage in 2 hours and 30 minutes per  week of moderate-intensity physical activity, or 1 hour and 15 minutes (75 minutes) per week of vigorous-intensity aerobic physical activity, or an equivalent combination of moderate and vigorous-intensity aerobic physical activity. Aerobic activity should be performed in episodes of at least 10 minutes preferably spread throughout the week.     Adhering to a heart healthy diet, regular exercise habits, avoidance of tobacco products, and maintenance of a healthy weight are crucial components of their heart disease risk reduction.     Any positive review of systems not specifically addressed in the office visit today should be evaluated and treated by the patients primary care physician or in an emergency department if necessary     Patient was notified that results from ordered tests will be called to the patient if it changes current management; it will otherwise be discussed at a future appointment and available on  [facility].     Thank you for allowing me to participate in the care of this patient.        This document was generated using the assistance of voice recognition software. If there are any errors of spelling, grammar, syntax, or meaning; please feel free to contact me directly for clarification.    Past Medical History:  She has no past medical history on file.    Past Surgical History:  She has a past surgical history that includes Other surgical history (04/20/2021); Other surgical history (04/20/2021); Other surgical history (04/20/2021); Other surgical history (05/07/2021); Other surgical history (05/07/2021); Tonsillectomy; Knee surgery (Right); and US guided thyroid biopsy (8/24/2023).      Social History:  She reports that she quit smoking about 32 years ago. Her smoking use included cigarettes. She started smoking about 52 years ago. She has a 20 pack-year smoking history. She has never used smokeless tobacco. She reports current alcohol use of about 5.0 - 6.0 standard drinks of alcohol per  week. She reports that she does not use drugs.    Family History:  Family History   Problem Relation Name Age of Onset    Hypertension Mother      Hyperlipidemia Mother      Hyperlipidemia Father      Heart attack Mother's Brother      Heart attack Mother's Brother      Breast cancer Father's Sister      Breast cancer Father's Sister          Allergies:  Ezetimibe and Statins-hmg-coa reductase inhibitors    Outpatient Medications:  Current Outpatient Medications   Medication Instructions    albuterol 90 mcg/actuation inhaler 2 puffs, inhalation, 4 times daily PRN    azelastine (Astelin) 137 mcg (0.1 %) nasal spray 1 spray, nasal, 2 times daily    B complex-vitamin C-folic acid (Nephro-Korey Rx) 1- mg-mg-mcg tablet 1 tablet, oral, Daily with breakfast    bempedoic acid (Nexletol) 180 mg tablet 1 tablet, oral, Daily    cholecalciferol (Vitamin D-3) 25 MCG (1000 UT) tablet 1 tablet, oral, Daily    ciclopirox (Penlac) 8 % solution Topical, Daily, To affected nail(s) daily x 48 weeks. Once per week clean the area with rubbing alcohol    coenzyme Q-10 100 mg capsule oral, Daily    famotidine (Pepcid) 20 mg tablet 1 tablet, oral, Daily PRN    fexofenadine (Allegra) 180 mg tablet 1 tablet, oral, Daily    fish oil concentrate (Omega-3) 120-180 mg capsule oral, Daily    fluticasone furoate-vilanteroL (Breo Ellipta) 200-25 mcg/dose inhaler 1 puff, inhalation, Daily    ibuprofen 600 mg, oral, Every 6 hours PRN    isosorbide mononitrate ER (IMDUR) 60 mg, oral, Daily    L. acidophilus/Bifid. animalis 32 billion cell capsule oral, Daily PRN    levothyroxine (SYNTHROID, LEVOXYL) 75 mcg, oral, Daily    montelukast (SINGULAIR) 10 mg, oral, Daily    multivit-min-folic acid-biotin (Hair,Skin and Nails,FA-biotin,) 133.3 mcg- 1,666.7 mcg capsule oral, Daily    propranolol (Inderal) 10 mg tablet oral    Repatha SureClick 140 mg, subcutaneous, Every 14 days    verapamil ER (VERALAN PM) 180 mg, oral, Nightly        ROS:  A 14 point  "review of systems was done and is negative other than as stated in HPI    Vitals:      1/12/2023    11:01 AM 3/20/2023     9:23 AM 6/15/2023    10:05 AM 7/11/2023    10:31 AM 12/8/2023     1:18 PM 1/9/2024    11:14 AM 4/9/2024     2:53 PM   Vitals   Systolic 130 152 160 120 118 118 149   Diastolic 60 74 72 68 64 64 82   Heart Rate 75 68 80 78 65 54 60   Temp 36.1 °C (96.9 °F) 36.4 °C (97.6 °F)  36.6 °C (97.9 °F)  36 °C (96.8 °F)    Resp  16     16   Height (in) 1.632 m (5' 4.25\")  1.638 m (5' 4.5\") 1.638 m (5' 4.5\") 1.651 m (5' 5\")  1.651 m (5' 5\")   Weight (lb) 166.2 162.8 154 157 148 145.8 143.7   BMI 28.31 kg/m2 27.73 kg/m2 26.03 kg/m2 26.53 kg/m2 24.63 kg/m2 24.26 kg/m2 23.91 kg/m2   BSA (m2) 1.85 m2 1.83 m2 1.78 m2 1.8 m2 1.75 m2 1.74 m2 1.73 m2        Physical Exam:   Constitutional: Cooperative, in no acute distress, alert, appears stated age.  Skin: Skin color, texture, turgor normal. No rashes or lesions.  Head: Normocephalic. No masses, lesions, tenderness or abnormalities  Eyes: Extraocular movements are grossly intact.  Mouth and throat: Mucous membranes moist  Neck: Neck supple, no carotid bruits, no JVD  Respiratory: Lungs clear to auscultation, no wheezing or rhonchi, no use of accessory muscles  Chest wall: No scars, normal excursion with respiration  Cardiovascular: Regular rhythm without murmur  Gastrointestinal: Abdomen soft, nontender. Bowel sounds normal.  Musculoskeletal: Strength equal in upper extremities  Extremities: No pitting edema  Neurologic: Sensation grossly intact, alert and oriented ×3    Intake/Output:   No intake/output data recorded.    Outpatient Medications  Current Outpatient Medications on File Prior to Visit   Medication Sig Dispense Refill    albuterol 90 mcg/actuation inhaler Inhale 2 puffs 4 times a day as needed for wheezing or shortness of breath. 54 g 1    azelastine (Astelin) 137 mcg (0.1 %) nasal spray Administer 1 spray into affected nostril(s) 2 times a day.      B " complex-vitamin C-folic acid (Nephro-Korey Rx) 1- mg-mg-mcg tablet Take 1 tablet by mouth once daily with breakfast.      bempedoic acid (Nexletol) 180 mg tablet Take 1 tablet by mouth once daily. 90 tablet 0    cholecalciferol (Vitamin D-3) 25 MCG (1000 UT) tablet Take 1 tablet (25 mcg) by mouth once daily.      ciclopirox (Penlac) 8 % solution Apply topically once daily. To affected nail(s) daily x 48 weeks. Once per week clean the area with rubbing alcohol 6.6 mL 11    coenzyme Q-10 100 mg capsule Take by mouth once daily.      evolocumab (Repatha SureClick) 140 mg/mL injection Inject 1 mL (140 mg) under the skin every 14 (fourteen) days. 2 each 12    famotidine (Pepcid) 20 mg tablet Take 1 tablet (20 mg) by mouth once daily as needed.      fexofenadine (Allegra) 180 mg tablet Take 1 tablet (180 mg) by mouth once daily.      fish oil concentrate (Omega-3) 120-180 mg capsule Take by mouth once daily.      fluticasone furoate-vilanteroL (Breo Ellipta) 200-25 mcg/dose inhaler Inhale 1 puff once daily.      ibuprofen 600 mg tablet Take 1 tablet (600 mg) by mouth every 6 hours if needed.      isosorbide mononitrate ER (Imdur) 60 mg 24 hr tablet TAKE 1 TABLET BY MOUTH DAILY 90 tablet 3    L. acidophilus/Bifid. animalis 32 billion cell capsule Take by mouth once daily as needed.      levothyroxine (Synthroid, Levoxyl) 75 mcg tablet Take 1 tablet (75 mcg) by mouth once daily. 90 tablet 1    montelukast (Singulair) 10 mg tablet Take 1 tablet (10 mg) by mouth once daily. 90 tablet 1    multivit-min-folic acid-biotin (Hair,Skin and Nails,FA-biotin,) 133.3 mcg- 1,666.7 mcg capsule Take by mouth once daily.      propranolol (Inderal) 10 mg tablet Take by mouth.      verapamil ER (Veralan PM) 180 mg 24 hr capsule Take 1 capsule (180 mg) by mouth once daily at bedtime. 90 capsule 1     No current facility-administered medications on file prior to visit.       Labs: (past 26 weeks)  Recent Results (from the past 4368  hour(s))   Dermatopathology- DERM LAB    Collection Time: 02/13/24  2:29 PM   Result Value Ref Range    Case Report       Dermatopathology                                  Case: [id number]                                   Authorizing Provider:  Ana Spann MD           Collected:           02/13/2024 1429              Ordering Location:     [facility]       Received:            02/13/2024 1701                                     [facility]                                                               Pathologist:           Alli Arizmendi MD                                                             Specimen:    SKIN SHAVE BIOPSY, Left 4th Finger Distal Interphalangeal Joint                            FINAL DIAGNOSIS       SKIN, LEFT 4TH FINGER DISTAL INTERPHALANGEAL JOINT, SHAVE BIOPSY:  VERRUCA.    ** Electronically signed out by Alli Arizmendi MD **                By the signature on this report, the individual or group listed as making the Final Interpretation/Diagnosis certifies that they have reviewed this case.       Clinical History       Encounter Diagnosis: Neoplasm of uncertain behavior of skin       [id number]  Collection Comments: Differential Diagnosis: scc vs hak  Check Margins Yes/No?:    Comments:    Dermpath Lab: Routine Histopathology (formalin-fixed tissue)  Finding Region: Left 4th Finger Distal Interphalangeal Joint  Specimen Objective: Ill defined keratotic pink papule approx 8-10mm      Microscopic Description       Microscopic examination reveals a specimen that extends into the deep reticular dermis.  There is hyperkeratosis, with areas of parakeratosis, and there is hypergranulosis, with enlargement of upper layer keratinocyte nuclei and perinuclear vacuolization.        Gross Description       A:  Received in formalin is 9 x 7 x 3 mm piece of skin in aggre.  It is tan in color.  It is shave in shape.  It was embedded in toto.  The specimen was inked.      The specimen was  grossed by Ailyn Ruvalcaba.           ECG  No results found for this or any previous visit (from the past 4464 hour(s)).    Echocardiogram  No results found for this or any previous visit from the past 1095 days.      CV Studies:  EKG: No results found for this or any previous visit (from the past 4464 hour(s)).  Echocardiogram:   ECHOCARDIOGRAM     Narrative  Ordered by an unspecified provider.    Stress Testing [facility]([id number]):   NM CARDIAC STRESS REST (MYOCARDIAL PERFUSION MIBI) 04/27/2022    Narrative  MRN: [id number]  Patient Name: JACKELINE CHANDLER    STUDY:  CARDIAC STRESS/REST INJECTION; PART 2 STRESS OR REST (NO CHARGE);  CARDIAC STRESS/REST (MYOCARDIAL PERFUSION/MIBI);  4/27/2022 10:16 am    INDICATION:  SOB  I10: HTN (hypertension) E78.00: Hypercholesterolemia I25.118:  Coronary artery disease with other form of angina pectoris.    COMPARISON:  None.    ACCESSION NUMBER(S):  [id number]; [id number]; [id number]    ORDERING CLINICIAN:  MARYCARMEN LNADA    TECHNIQUE:  DIVISION OF NUCLEAR MEDICINE  STRESS MYOCARDIAL PERFUSION SCAN, ONE DAY PROTOCOL    The patient received an intravenous dose of  11.5 mCi of Tc-99m  tetrofosmin and resting emission tomographic (SPECT) images of the  myocardium were acquired. The patient then exercised via treadmill  stress to  85 % of MPHR and achieved  10.1 METS. At peak stress  35.7  mCi of Tc-99m tetrofosmin were administered and stress phase SPECT  images of the myocardium were then acquired. These included ECG-gated  images to assess and quantify ventricular function.    FINDINGS:  There is a small partially reversible perfusion defect in the apical  wall which resolves on prone imaging suggesting soft tissue  attenuation.    Calculated ejection fraction is 64% without segmental wall motion  abnormalities seen.    Impression  1. There is a small partially reversible perfusion defect in the  apical wall which resolves on prone imaging suggesting soft tissue  attenuation. There  is a low probability of ischemia.    2. Calculated ejection fraction is 64% without segmental wall motion  abnormalities seen.    Cardiac Catheterization: No results found for this or any previous visit from the past 1825 days.  No results found for this or any previous visit from the past 3650 days.     Cardiac Scoring: No results found for this or any previous visit from the past 1825 days.    AAA : No results found for this or any previous visit from the past 1825 days.    OTHER: No results found for this or any previous visit from the past 1825 days.    LAST IMAGING RESULTS  ECG 12 Lead  Sinus rhythm with sinus arrhythmia, heart rate 65 bpm.      Problem List Items Addressed This Visit       CAD (coronary artery disease) - Primary    HTN (hypertension)    Dyslipidemia    Statin intolerance    Chronic diastolic heart failure (Multi)          Enrique Esquivel DO, FACC, FACOI

## 2024-06-17 ENCOUNTER — LAB (OUTPATIENT)
Dept: LAB | Facility: LAB | Age: 79
End: 2024-06-17
Payer: MEDICARE

## 2024-06-17 ENCOUNTER — OFFICE VISIT (OUTPATIENT)
Dept: PODIATRY | Facility: CLINIC | Age: 79
End: 2024-06-17
Payer: MEDICARE

## 2024-06-17 DIAGNOSIS — L85.1 ACQUIRED PLANTAR POROKERATOSIS: ICD-10-CM

## 2024-06-17 DIAGNOSIS — E78.00 HYPERCHOLESTEROLEMIA: ICD-10-CM

## 2024-06-17 DIAGNOSIS — I50.32 CHRONIC DIASTOLIC HEART FAILURE (MULTI): ICD-10-CM

## 2024-06-17 DIAGNOSIS — M77.42 METATARSALGIA OF LEFT FOOT: ICD-10-CM

## 2024-06-17 DIAGNOSIS — L90.9 PLANTAR FAT PAD ATROPHY: ICD-10-CM

## 2024-06-17 DIAGNOSIS — M79.672 LEFT FOOT PAIN: Primary | ICD-10-CM

## 2024-06-17 DIAGNOSIS — I10 PRIMARY HYPERTENSION: ICD-10-CM

## 2024-06-17 DIAGNOSIS — E78.5 DYSLIPIDEMIA: ICD-10-CM

## 2024-06-17 DIAGNOSIS — I25.10 CORONARY ARTERY DISEASE INVOLVING NATIVE CORONARY ARTERY OF NATIVE HEART, UNSPECIFIED WHETHER ANGINA PRESENT: ICD-10-CM

## 2024-06-17 DIAGNOSIS — E03.9 HYPOTHYROIDISM, UNSPECIFIED TYPE: ICD-10-CM

## 2024-06-17 DIAGNOSIS — Z78.9 STATIN INTOLERANCE: ICD-10-CM

## 2024-06-17 LAB
ALBUMIN SERPL BCP-MCNC: 4 G/DL (ref 3.4–5)
ALP SERPL-CCNC: 53 U/L (ref 33–136)
ALT SERPL W P-5'-P-CCNC: 17 U/L (ref 7–45)
ANION GAP SERPL CALC-SCNC: 12 MMOL/L (ref 10–20)
AST SERPL W P-5'-P-CCNC: 22 U/L (ref 9–39)
BILIRUB SERPL-MCNC: 0.8 MG/DL (ref 0–1.2)
BNP SERPL-MCNC: 136 PG/ML (ref 0–99)
BUN SERPL-MCNC: 21 MG/DL (ref 6–23)
CALCIUM SERPL-MCNC: 9.6 MG/DL (ref 8.6–10.3)
CHLORIDE SERPL-SCNC: 103 MMOL/L (ref 98–107)
CHOLEST SERPL-MCNC: 113 MG/DL (ref 0–199)
CHOLESTEROL/HDL RATIO: 1.5
CO2 SERPL-SCNC: 28 MMOL/L (ref 21–32)
CREAT SERPL-MCNC: 1.16 MG/DL (ref 0.5–1.05)
EGFRCR SERPLBLD CKD-EPI 2021: 48 ML/MIN/1.73M*2
ERYTHROCYTE [DISTWIDTH] IN BLOOD BY AUTOMATED COUNT: 14.1 % (ref 11.5–14.5)
GLUCOSE SERPL-MCNC: 92 MG/DL (ref 74–99)
HCT VFR BLD AUTO: 42.9 % (ref 36–46)
HDLC SERPL-MCNC: 73.3 MG/DL
HGB BLD-MCNC: 14.1 G/DL (ref 12–16)
LDLC SERPL CALC-MCNC: 25 MG/DL
MAGNESIUM SERPL-MCNC: 2.11 MG/DL (ref 1.6–2.4)
MCH RBC QN AUTO: 30.9 PG (ref 26–34)
MCHC RBC AUTO-ENTMCNC: 32.9 G/DL (ref 32–36)
MCV RBC AUTO: 94 FL (ref 80–100)
NON HDL CHOLESTEROL: 40 MG/DL (ref 0–149)
NRBC BLD-RTO: 0 /100 WBCS (ref 0–0)
PLATELET # BLD AUTO: 135 X10*3/UL (ref 150–450)
POTASSIUM SERPL-SCNC: 4.2 MMOL/L (ref 3.5–5.3)
PROT SERPL-MCNC: 6.1 G/DL (ref 6.4–8.2)
RBC # BLD AUTO: 4.56 X10*6/UL (ref 4–5.2)
SODIUM SERPL-SCNC: 139 MMOL/L (ref 136–145)
TRIGL SERPL-MCNC: 73 MG/DL (ref 0–149)
TSH SERPL-ACNC: 0.92 MIU/L (ref 0.44–3.98)
VLDL: 15 MG/DL (ref 0–40)
WBC # BLD AUTO: 4.8 X10*3/UL (ref 4.4–11.3)

## 2024-06-17 PROCEDURE — 36415 COLL VENOUS BLD VENIPUNCTURE: CPT

## 2024-06-17 PROCEDURE — 83735 ASSAY OF MAGNESIUM: CPT

## 2024-06-17 PROCEDURE — 84443 ASSAY THYROID STIM HORMONE: CPT

## 2024-06-17 PROCEDURE — 1036F TOBACCO NON-USER: CPT | Performed by: PODIATRIST

## 2024-06-17 PROCEDURE — 80053 COMPREHEN METABOLIC PANEL: CPT

## 2024-06-17 PROCEDURE — 80061 LIPID PANEL: CPT

## 2024-06-17 PROCEDURE — 85027 COMPLETE CBC AUTOMATED: CPT

## 2024-06-17 PROCEDURE — 83880 ASSAY OF NATRIURETIC PEPTIDE: CPT

## 2024-06-17 PROCEDURE — 1159F MED LIST DOCD IN RCRD: CPT | Performed by: PODIATRIST

## 2024-06-17 PROCEDURE — 99202 OFFICE O/P NEW SF 15 MIN: CPT | Performed by: PODIATRIST

## 2024-06-17 PROCEDURE — 1160F RVW MEDS BY RX/DR IN RCRD: CPT | Performed by: PODIATRIST

## 2024-06-17 NOTE — PROGRESS NOTES
This is a 78 y.o. female new patient for left foot pain    History of Present Illness:   Patient states they are here for L foot pain  Has callous  Was recommended to get foot surgery  Here for second opinion    Past Medical History  No past medical history on file.    Medications and Allergies have been reviewed.    Review Of Systems:  GENERAL: No weight loss, malaise or fevers.  HEENT: Negative for frequent or significant headaches,   RESPIRATORY: Negative for cough, wheezing or shortness of breath.  CARDIOVASCULAR: Negative for chest pain, leg swelling or palpitations.    Physical Exam:  Patient is a pleasant, cooperative, well developed 78 y.o.  adult female. The patient is alert and oriented to time, place and person.   Patient has normal affect and mood.    Examination of Both Lower Extremities:   Objective:   Vasc: DP and PT pulses are palpable bilateral.  CFT is less than 3 seconds bilateral.  Skin temperature is warm to cool proximal to distal bilateral.  Varicosities noted.     Neuro: Vibratory, light touch and proprioception are intact bilateral.      Derm: Nails 1-5 bilateral are intact.  Skin is supple with normal texture and turgor noted. HPK left sub 4th met head.     Ortho: Muscle strength is 5/5 for all pedal groups tested.     1. Left foot pain        2. Acquired plantar porokeratosis        3. Metatarsalgia of left foot        4. Plantar fat pad atrophy          Patient educated on proper foot care.  All hpk tissue was debrided to smooth skin  Discussed foot fat pad atrophy  Minimize walking barefoot  Patient to follow up prn  Patient was in agreement to this plan. All questions answered.      Mamta Wiseman DPM  [phone]  Option 2  Fax: [phone]

## 2024-06-18 DIAGNOSIS — I25.10 CORONARY ARTERY DISEASE INVOLVING NATIVE CORONARY ARTERY OF NATIVE HEART, UNSPECIFIED WHETHER ANGINA PRESENT: ICD-10-CM

## 2024-06-18 DIAGNOSIS — E78.5 DYSLIPIDEMIA: ICD-10-CM

## 2024-06-18 NOTE — TELEPHONE ENCOUNTER
6/18/24  1109  Called results and directive to continue current care to patient with patient verbalizing understanding.      ----- Message from Enrique Esquivel DO sent at 6/18/2024  8:15 AM EDT -----  Lipid panel shows significant improvement of her LDL cholesterol down to 25.  Continue current care.

## 2024-06-19 RX ORDER — BEMPEDOIC ACID 180 MG/1
1 TABLET, FILM COATED ORAL DAILY
Qty: 90 TABLET | Refills: 3 | Status: SHIPPED | OUTPATIENT
Start: 2024-06-19

## 2024-06-26 ENCOUNTER — APPOINTMENT (OUTPATIENT)
Dept: CARDIOLOGY | Facility: CLINIC | Age: 79
End: 2024-06-26
Payer: MEDICARE

## 2024-06-26 VITALS
HEART RATE: 58 BPM | BODY MASS INDEX: 23.32 KG/M2 | HEIGHT: 65 IN | DIASTOLIC BLOOD PRESSURE: 70 MMHG | SYSTOLIC BLOOD PRESSURE: 138 MMHG | WEIGHT: 140 LBS

## 2024-06-26 DIAGNOSIS — E78.5 DYSLIPIDEMIA: ICD-10-CM

## 2024-06-26 DIAGNOSIS — I25.10 CORONARY ARTERY DISEASE INVOLVING NATIVE CORONARY ARTERY OF NATIVE HEART WITHOUT ANGINA PECTORIS: Primary | ICD-10-CM

## 2024-06-26 DIAGNOSIS — I10 PRIMARY HYPERTENSION: ICD-10-CM

## 2024-06-26 DIAGNOSIS — I25.10 CORONARY ARTERY DISEASE INVOLVING NATIVE CORONARY ARTERY OF NATIVE HEART, UNSPECIFIED WHETHER ANGINA PRESENT: ICD-10-CM

## 2024-06-26 DIAGNOSIS — Z78.9 STATIN INTOLERANCE: ICD-10-CM

## 2024-06-26 DIAGNOSIS — I50.32 CHRONIC DIASTOLIC HEART FAILURE (MULTI): ICD-10-CM

## 2024-06-26 PROCEDURE — 3078F DIAST BP <80 MM HG: CPT | Performed by: INTERNAL MEDICINE

## 2024-06-26 PROCEDURE — 93000 ELECTROCARDIOGRAM COMPLETE: CPT | Performed by: INTERNAL MEDICINE

## 2024-06-26 PROCEDURE — 3075F SYST BP GE 130 - 139MM HG: CPT | Performed by: INTERNAL MEDICINE

## 2024-06-26 PROCEDURE — 99214 OFFICE O/P EST MOD 30 MIN: CPT | Performed by: INTERNAL MEDICINE

## 2024-06-26 PROCEDURE — 1036F TOBACCO NON-USER: CPT | Performed by: INTERNAL MEDICINE

## 2024-06-26 PROCEDURE — 1159F MED LIST DOCD IN RCRD: CPT | Performed by: INTERNAL MEDICINE

## 2024-06-26 RX ORDER — NAPROXEN 500 MG/1
1 TABLET ORAL 2 TIMES DAILY PRN
COMMUNITY
Start: 2023-08-01

## 2024-07-02 ENCOUNTER — APPOINTMENT (OUTPATIENT)
Dept: PODIATRY | Facility: CLINIC | Age: 79
End: 2024-07-02
Payer: MEDICARE

## 2024-07-09 ENCOUNTER — APPOINTMENT (OUTPATIENT)
Dept: PRIMARY CARE | Facility: CLINIC | Age: 79
End: 2024-07-09
Payer: MEDICARE

## 2024-07-09 VITALS
DIASTOLIC BLOOD PRESSURE: 68 MMHG | BODY MASS INDEX: 23.33 KG/M2 | SYSTOLIC BLOOD PRESSURE: 122 MMHG | OXYGEN SATURATION: 96 % | WEIGHT: 140.2 LBS | HEART RATE: 66 BPM | TEMPERATURE: 97.7 F

## 2024-07-09 DIAGNOSIS — I10 PRIMARY HYPERTENSION: Primary | ICD-10-CM

## 2024-07-09 DIAGNOSIS — E03.9 HYPOTHYROIDISM, UNSPECIFIED TYPE: ICD-10-CM

## 2024-07-09 DIAGNOSIS — D86.0 SARCOIDOSIS OF LUNG (MULTI): ICD-10-CM

## 2024-07-09 DIAGNOSIS — I50.32 CHRONIC DIASTOLIC HEART FAILURE (MULTI): ICD-10-CM

## 2024-07-09 DIAGNOSIS — J45.20 MILD INTERMITTENT ASTHMA WITHOUT COMPLICATION (HHS-HCC): ICD-10-CM

## 2024-07-09 DIAGNOSIS — E78.00 HYPERCHOLESTEROLEMIA: ICD-10-CM

## 2024-07-09 PROCEDURE — G2211 COMPLEX E/M VISIT ADD ON: HCPCS | Performed by: INTERNAL MEDICINE

## 2024-07-09 PROCEDURE — 1159F MED LIST DOCD IN RCRD: CPT | Performed by: INTERNAL MEDICINE

## 2024-07-09 PROCEDURE — 1123F ACP DISCUSS/DSCN MKR DOCD: CPT | Performed by: INTERNAL MEDICINE

## 2024-07-09 PROCEDURE — 99214 OFFICE O/P EST MOD 30 MIN: CPT | Performed by: INTERNAL MEDICINE

## 2024-07-09 PROCEDURE — 3074F SYST BP LT 130 MM HG: CPT | Performed by: INTERNAL MEDICINE

## 2024-07-09 PROCEDURE — 3078F DIAST BP <80 MM HG: CPT | Performed by: INTERNAL MEDICINE

## 2024-07-09 RX ORDER — LEVOTHYROXINE SODIUM 75 UG/1
75 TABLET ORAL DAILY
Qty: 90 TABLET | Refills: 1 | Status: SHIPPED | OUTPATIENT
Start: 2024-07-09

## 2024-07-09 RX ORDER — VERAPAMIL HYDROCHLORIDE 180 MG/1
180 CAPSULE, EXTENDED RELEASE ORAL NIGHTLY
Qty: 90 CAPSULE | Refills: 1 | Status: SHIPPED | OUTPATIENT
Start: 2024-07-09

## 2024-07-09 RX ORDER — ALBUTEROL SULFATE 90 UG/1
2 AEROSOL, METERED RESPIRATORY (INHALATION) 4 TIMES DAILY PRN
Qty: 54 G | Refills: 1 | Status: SHIPPED | OUTPATIENT
Start: 2024-07-09

## 2024-07-09 RX ORDER — MONTELUKAST SODIUM 10 MG/1
10 TABLET ORAL DAILY
Qty: 90 TABLET | Refills: 1 | Status: SHIPPED | OUTPATIENT
Start: 2024-07-09

## 2024-07-09 ASSESSMENT — ENCOUNTER SYMPTOMS
CARDIOVASCULAR NEGATIVE: 1
NEUROLOGICAL NEGATIVE: 1
PSYCHIATRIC NEGATIVE: 1
EYES NEGATIVE: 1
GASTROINTESTINAL NEGATIVE: 1
CONSTITUTIONAL NEGATIVE: 1
NECK PAIN: 1
HEMATOLOGIC/LYMPHATIC NEGATIVE: 1
RESPIRATORY NEGATIVE: 1
ALLERGIC/IMMUNOLOGIC NEGATIVE: 1
ENDOCRINE NEGATIVE: 1

## 2024-07-09 NOTE — PROGRESS NOTES
Subjective   Patient ID: IRON Bazan is a 78 y.o. female who presents for 6 month follow up .  Patient presents today in follow up re:   HTN, hypothyroid and asthma.    Patient presents in follow up regarding hypertension.  Blood pressure has been well controlled on current therapy.  No adverse effects of medication reported.  Patient denies chest pain, palpitations, shortness of breath, orthopnea, fatigue or edema.    Patient presents in follow up regarding hypothyroidism.  Is taking medication as prescribed.  Has been feeling well.  Denies fatigue, weight gain or loss, diarrhea, constipation or change in hair/skin.    Pt. states asthma has been well controlled on current treatment regimen.  Has rare flares requiring rescue inhaler.  Denies cough, wheezing or other associated symptoms.          Review of Systems   Constitutional: Negative.    HENT: Negative.     Eyes: Negative.    Respiratory: Negative.     Cardiovascular: Negative.    Gastrointestinal: Negative.    Endocrine: Negative.    Genitourinary: Negative.    Musculoskeletal:  Positive for neck pain.   Skin: Negative.    Allergic/Immunologic: Negative.    Neurological: Negative.    Hematological: Negative.    Psychiatric/Behavioral: Negative.         Objective     Blood pressure 122/68, pulse 66, temperature 36.5 °C (97.7 °F), temperature source Temporal, weight 63.6 kg (140 lb 3.2 oz), SpO2 96%.   Physical Exam  Vitals reviewed.   Constitutional:       Appearance: Normal appearance.   Neck:      Vascular: No carotid bruit.   Cardiovascular:      Rate and Rhythm: Normal rate and regular rhythm.      Pulses: Normal pulses.      Heart sounds: Normal heart sounds. No murmur heard.  Pulmonary:      Effort: Pulmonary effort is normal.      Breath sounds: Normal breath sounds. No wheezing or rales.   Abdominal:      General: Abdomen is flat. There is no distension.      Palpations: Abdomen is soft.      Tenderness: There is no abdominal tenderness.    Musculoskeletal:         General: Normal range of motion.      Cervical back: Normal range of motion and neck supple.   Skin:     General: Skin is warm and dry.   Neurological:      General: No focal deficit present.      Mental Status: She is alert and oriented to person, place, and time.   Psychiatric:         Mood and Affect: Mood normal.         Behavior: Behavior normal.         Assessment/Plan   Problem List Items Addressed This Visit       Bronchial asthma (HHS-HCC)    Relevant Medications    montelukast (Singulair) 10 mg tablet    albuterol 90 mcg/actuation inhaler    HTN (hypertension) - Primary    Relevant Medications    verapamil ER (Veralan PM) 180 mg 24 hr capsule    Hypercholesterolemia    Hypothyroidism    Relevant Medications    levothyroxine (Synthroid, Levoxyl) 75 mcg tablet    Other Relevant Orders    TSH with reflex to Free T4 if abnormal    Sarcoidosis of lung (Multi)    Chronic diastolic heart failure (Multi)    Relevant Medications    verapamil ER (Veralan PM) 180 mg 24 hr capsule       BP well controlled on current therapy.  Will continue present therapy and follow.  Euthyroid on current dose of supplemental thyroid.  Advised to continue present dose and will continue to follow.   Asthma Sx well compensated on current therapy.  Will continue present medication therapy and follow clinically.  She continues to follow with Cardio and Pulmonology in re:  respective issues.  We had discussion re:  her very, very low LDL cholesterol on q2 weekly Repatha.  She questions if this is healthy and I agree with her that it is probably too low considering the necessity of cholesterol as a necessary building block for other entities in the body, such as Vit. D.  She is considering changing to every 3 weeks and I advised that I would have no issue with this, though she should discuss with Cardio as well.  She is having persistent issue with cervicalgia which has been present for some time and she has  continued chiropractic care.      Follow up in 6 months  Lab to be drawn 1 week prior to next office visit.

## 2024-08-13 ENCOUNTER — APPOINTMENT (OUTPATIENT)
Dept: DERMATOLOGY | Facility: CLINIC | Age: 79
End: 2024-08-13
Payer: MEDICARE

## 2024-11-07 ENCOUNTER — LAB (OUTPATIENT)
Dept: LAB | Facility: LAB | Age: 79
End: 2024-11-07
Payer: MEDICARE

## 2024-11-07 ENCOUNTER — TELEPHONE (OUTPATIENT)
Dept: PRIMARY CARE | Facility: CLINIC | Age: 79
End: 2024-11-07

## 2024-11-07 NOTE — TELEPHONE ENCOUNTER
Per patient the repatha  shots are further apart than they were and she wants to see if that has helped the cholesterol level

## 2024-11-11 DIAGNOSIS — J45.20 MILD INTERMITTENT ASTHMA WITHOUT COMPLICATION (HHS-HCC): Primary | ICD-10-CM

## 2024-11-11 RX ORDER — FLUTICASONE FUROATE AND VILANTEROL 200; 25 UG/1; UG/1
1 POWDER RESPIRATORY (INHALATION) DAILY
Qty: 1 EACH | Refills: 5 | Status: CANCELLED | OUTPATIENT
Start: 2024-11-11

## 2024-11-11 RX ORDER — FLUTICASONE FUROATE AND VILANTEROL 200; 25 UG/1; UG/1
1 POWDER RESPIRATORY (INHALATION) DAILY
Qty: 3 EACH | Refills: 3 | Status: SHIPPED | OUTPATIENT
Start: 2024-11-11

## 2024-11-12 DIAGNOSIS — I10 PRIMARY HYPERTENSION: Primary | ICD-10-CM

## 2024-11-12 DIAGNOSIS — I50.32 CHRONIC DIASTOLIC HEART FAILURE: ICD-10-CM

## 2024-12-04 DIAGNOSIS — I25.10 CORONARY ARTERY DISEASE INVOLVING NATIVE CORONARY ARTERY OF NATIVE HEART, UNSPECIFIED WHETHER ANGINA PRESENT: ICD-10-CM

## 2024-12-06 RX ORDER — ISOSORBIDE MONONITRATE 60 MG/1
60 TABLET, EXTENDED RELEASE ORAL DAILY
Qty: 90 TABLET | Refills: 3 | Status: SHIPPED | OUTPATIENT
Start: 2024-12-06

## 2024-12-12 ENCOUNTER — LAB (OUTPATIENT)
Dept: LAB | Facility: LAB | Age: 79
End: 2024-12-12
Payer: MEDICARE

## 2024-12-12 DIAGNOSIS — I10 PRIMARY HYPERTENSION: ICD-10-CM

## 2024-12-12 DIAGNOSIS — I50.32 CHRONIC DIASTOLIC HEART FAILURE: ICD-10-CM

## 2024-12-12 LAB
ANION GAP SERPL CALC-SCNC: 14 MMOL/L (ref 10–20)
BNP SERPL-MCNC: 94 PG/ML (ref 0–99)
BUN SERPL-MCNC: 21 MG/DL (ref 6–23)
CALCIUM SERPL-MCNC: 9.7 MG/DL (ref 8.6–10.3)
CHLORIDE SERPL-SCNC: 102 MMOL/L (ref 98–107)
CO2 SERPL-SCNC: 28 MMOL/L (ref 21–32)
CREAT SERPL-MCNC: 1.03 MG/DL (ref 0.5–1.05)
EGFRCR SERPLBLD CKD-EPI 2021: 55 ML/MIN/1.73M*2
GLUCOSE SERPL-MCNC: 95 MG/DL (ref 74–99)
MAGNESIUM SERPL-MCNC: 2.33 MG/DL (ref 1.6–2.4)
POTASSIUM SERPL-SCNC: 4.7 MMOL/L (ref 3.5–5.3)
SODIUM SERPL-SCNC: 139 MMOL/L (ref 136–145)

## 2024-12-12 PROCEDURE — 83735 ASSAY OF MAGNESIUM: CPT

## 2024-12-12 PROCEDURE — 83880 ASSAY OF NATRIURETIC PEPTIDE: CPT

## 2024-12-12 PROCEDURE — 36415 COLL VENOUS BLD VENIPUNCTURE: CPT

## 2024-12-12 PROCEDURE — 80048 BASIC METABOLIC PNL TOTAL CA: CPT

## 2024-12-13 DIAGNOSIS — E78.5 DYSLIPIDEMIA: Primary | ICD-10-CM

## 2024-12-17 ENCOUNTER — LAB (OUTPATIENT)
Dept: LAB | Facility: LAB | Age: 79
End: 2024-12-17
Payer: MEDICARE

## 2024-12-17 DIAGNOSIS — E78.5 DYSLIPIDEMIA: ICD-10-CM

## 2024-12-17 LAB
CHOLEST SERPL-MCNC: 125 MG/DL (ref 0–199)
CHOLESTEROL/HDL RATIO: 1.5
HDLC SERPL-MCNC: 81.2 MG/DL
LDLC SERPL CALC-MCNC: 30 MG/DL
NON HDL CHOLESTEROL: 44 MG/DL (ref 0–149)
TRIGL SERPL-MCNC: 71 MG/DL (ref 0–149)
VLDL: 14 MG/DL (ref 0–40)

## 2024-12-17 PROCEDURE — 80061 LIPID PANEL: CPT

## 2024-12-17 PROCEDURE — 36415 COLL VENOUS BLD VENIPUNCTURE: CPT

## 2024-12-18 PROBLEM — R42 ORTHOSTATIC LIGHTHEADEDNESS: Status: ACTIVE | Noted: 2024-12-18

## 2024-12-18 PROBLEM — I50.30 (HFPEF) HEART FAILURE WITH PRESERVED EJECTION FRACTION: Status: ACTIVE | Noted: 2024-12-18

## 2024-12-18 NOTE — PROGRESS NOTES
[facility]    Patient Name: Maggie Bazan  Patient : 1945      Scribe Attestation  By signing my name below, IViviana Scribe   attest that this documentation has been prepared under the direction and in the presence of Enrique Esquivel DO.      Reason for visit:  This is an 79-year-old female here for follow-up regarding her history of coronary artery disease as seen on cardiac catheterization done in 2017, HFpEF with an ejection fraction of 62%, hypertension, dyslipidemia/statin and Zetia intolerance, and lightheadedness/orthostasis.     HPI:  This is an 79-year-old female here for follow-up regarding her history of coronary artery disease as seen on cardiac catheterization done in 2017, HFpEF with an ejection fraction of 62%, hypertension, dyslipidemia/statin and Zetia intolerance, and lightheadedness/orthostasis.  The patient was last evaluated by me in 2024.  At that visit I ordered blood work including BMP/BNP/magnesium be drawn in 6 months and asked the patient to follow-up in 6 months and sooner if necessary. BMP done on 24 showed normal serum sodium and serum potassium with a serum creatinine of 1.03. Serum magnesium was 2.33, BNP 94. Lipid panel done in 2024 showed an LDL cholesterol of 30 and triglycerides of 71 currently on bempedoic acid 180 mg daily and  Repatha 140 mg subcutaneous every 2 weeks. ECG done today showed sinus rhythm with a heart rate of 65 bpm. The patient reports that she has been feeling generally well from the cardiac standpoint. She states that the previously reported lightheadedness had improved. She denies any new chest pain, shortness of breath, palpitations and lightheadedness. She states that she takes all of her medications as prescribed. During my exam, she was resting comfortably on the exam table.          Assessment/Plan:   1. Coronary artery disease  The patient has a reported  history of history of coronary artery disease as seen on cardiac catheterization done in June 2017.  She also had a history of vasospasm and is on Imdur and verapamil for symptom control.  Nuclear stress done 4/27/2022 showed a small partially reversible perfusion defect in the apical wall which resolved on prone imaging consistent with soft tissue attenuation, low probability of ischemia, calculated ejection fraction 64%.  Outside echocardiogram done 3/8/2021 showed normal left ventricular systolic function with an ejection fraction of 62%, left ventricular diastolic function, normal right ventricular systolic function, mild mitral and tricuspid valve regurgitation.  ECG done today showed sinus rhythm with a heart rate of 65 bpm.   She denies anginal chest discomfort.   Blood pressure appears controlled on exam today.  She should continue her current antihypertensive medications.  Recent lab works as noted in the HPI.   Lipid panel done in December 2024 showed an LDL cholesterol of 30 and triglycerides of 71 currently on bempedoic acid 180 mg daily and  Repatha 140 mg subcutaneous every 2 weeks.  Lab works as noted below will be done in 6 months prior to his next visit.   Please see lifestyle recommendations below.  Follow up in 6 months and sooner if necessary.      2. HFpEF  The patient has HFpEF  Nuclear stress done 4/27/2022 showed a small partially reversible perfusion defect in the apical wall which resolved on prone imaging consistent with soft tissue attenuation, low probability of ischemia, calculated ejection fraction 64%.  Outside echocardiogram done 3/8/2021 showed normal left ventricular systolic function with an ejection fraction of 62%, left ventricular diastolic function, normal right ventricular systolic function, mild mitral and tricuspid valve regurgitation.  She does not appear significantly volume overloaded on physical exam.  She should continue current cardiac medications.   Recent lab works  as noted in the HPI.   Lab works as noted below will be done in 6 months prior to his next visit.  I discussed with her the importance of following a low-sodium heart healthy diet.  Follow up in 6 months and sooner if necessary.      3. Hypertension  The patient has a history of hypertension and blood pressure appears controlled on exam today.   She should continue her current antihypertensive medications and monitor her blood pressure at home.      4. Dyslipidemia/Statin and Zetia intolerance  Lipid panel done in December 2024 showed an LDL cholesterol of 30 and triglycerides of 71 currently on bempedoic acid 180 mg daily and  Repatha 140 mg subcutaneous every 2 weeks.  Please see lifestyle recommendations below.     5. Lightheadedness  The previously reported intermittent lightheadedness when quickly changing positions had improved.   ECG done today showed sinus rhythm with a heart rate of 65 bpm.    Blood pressure appears controlled on exam today.  Recent lab works as noted in the HPI.  Lab works as noted below will be done in 6 months prior to his next visit.   Patient should follow general recommendations including staying well-hydrated, changing the positions slowly, wearing compression stockings as necessary, and routine exercise.             Orders:   BMP/BNP/Mg in 6 months  Follow-up in 6 months.    Lifestyle Recommendations  I recommend a whole-food plant-based diet, an eating pattern that encourages the consumption of unrefined plant foods (such as fruits, vegetables, tubers, whole grains, legumes, nuts and seeds) and discourages meats, dairy products, eggs and processed foods.     The AHA/ACC recommends that the patient consume a dietary pattern that emphasizes intake of vegetables, fruits, and whole grains; includes low-fat dairy products, poultry, fish, legumes, non-tropical vegetable oils, and nuts; and limits intake of sodium, sweets, sugar-sweetened beverages, and red meats.  Adapt this dietary  pattern to appropriate calorie requirements (a 500-750 kcal/day deficit to loose weight), personal and cultural food preferences, and nutrition therapy for other medical conditions (including diabetes).  Achieve this pattern by following plans such as the [place] Mediterranean, DASH dietary pattern, or AHA diet.     Engage in 2 hours and 30 minutes per week of moderate-intensity physical activity, or 1 hour and 15 minutes (75 minutes) per week of vigorous-intensity aerobic physical activity, or an equivalent combination of moderate and vigorous-intensity aerobic physical activity. Aerobic activity should be performed in episodes of at least 10 minutes preferably spread throughout the week.     Adhering to a heart healthy diet, regular exercise habits, avoidance of tobacco products, and maintenance of a healthy weight are crucial components of their heart disease risk reduction.     Any positive review of systems not specifically addressed in the office visit today should be evaluated and treated by the patients primary care physician or in an emergency department if necessary     Patient was notified that results from ordered tests will be called to the patient if it changes current management; it will otherwise be discussed at a future appointment and available on  [email or web address].     Thank you for allowing me to participate in the care of this patient.        This document was generated using the assistance of voice recognition software. If there are any errors of spelling, grammar, syntax, or meaning; please feel free to contact me directly for clarification.    Past Medical History:  She has no past medical history on file.    Past Surgical History:  She has a past surgical history that includes Other surgical history (04/20/2021); Other surgical history (04/20/2021); Other surgical history (04/20/2021); Other surgical history (05/07/2021); Other surgical history (05/07/2021); Tonsillectomy; Knee surgery (Right); and  US guided thyroid biopsy (8/24/2023).      Social History:  She reports that she quit smoking about 32 years ago. Her smoking use included cigarettes. She started smoking about 53 years ago. She has a 20 pack-year smoking history. She has never used smokeless tobacco. She reports current alcohol use of about 5.0 - 6.0 standard drinks of alcohol per week. She reports that she does not use drugs.    Family History:  Family History   Problem Relation Name Age of Onset    Hypertension Mother      Hyperlipidemia Mother      Hyperlipidemia Father      Heart attack Mother's Brother      Heart attack Mother's Brother      Breast cancer Father's Sister      Breast cancer Father's Sister          Allergies:  Ezetimibe and Statins-hmg-coa reductase inhibitors    Outpatient Medications:  Current Outpatient Medications   Medication Instructions    albuterol 90 mcg/actuation inhaler 2 puffs, inhalation, 4 times daily PRN    azelastine (Astelin) 137 mcg (0.1 %) nasal spray 1 spray, nasal, 2 times daily    B complex-vitamin C-folic acid (Nephro-Korey Rx) 1- mg-mg-mcg tablet 1 tablet, oral, Daily with breakfast    cholecalciferol (Vitamin D-3) 25 MCG (1000 UT) tablet 1 tablet, oral, Daily    coenzyme Q-10 100 mg capsule oral, Daily    famotidine (Pepcid) 20 mg tablet 1 tablet, oral, Daily PRN    fexofenadine (Allegra) 180 mg tablet 1 tablet, oral, Daily    fish oil concentrate (Omega-3) 120-180 mg capsule oral, Daily    fluticasone furoate-vilanteroL (Breo Ellipta) 200-25 mcg/dose inhaler 1 puff, inhalation, Daily    ibuprofen 600 mg, oral, Every 6 hours PRN    isosorbide mononitrate ER (IMDUR) 60 mg, oral, Daily    L. acidophilus/Bifid. animalis 32 billion cell capsule oral, Daily PRN    levothyroxine (SYNTHROID, LEVOXYL) 75 mcg, oral, Daily    montelukast (SINGULAIR) 10 mg, oral, Daily    multivit-min-folic acid-biotin (Hair,Skin and Nails,FA-biotin,) 133.3 mcg- 1,666.7 mcg capsule oral, Daily    naproxen (Naprosyn) 500 mg  "tablet 1 tablet, oral, 2 times daily PRN    Nexletol 180 mg tablet 1 tablet, oral, Daily    propranolol (Inderal) 10 mg tablet oral    Repatha SureClick 140 mg, subcutaneous, Every 14 days    verapamil ER (VERALAN PM) 180 mg, oral, Nightly        ROS:  A 14 point review of systems was done and is negative other than as stated in HPI    Vitals:      7/11/2023    10:31 AM 12/8/2023     1:18 PM 1/9/2024    11:14 AM 4/9/2024     2:53 PM 6/26/2024    12:27 PM 7/9/2024    11:04 AM 7/9/2024    11:42 AM   Vitals   Systolic 120 118 118 149 138 142 122   Diastolic 68 64 64 82 70 60 68   BP Location Left arm  Left arm   Left arm Left arm   Heart Rate 78 65 54 60 58 66    Temp 36.6 °C (97.9 °F)  36 °C (96.8 °F)   36.5 °C (97.7 °F)    Resp    16      Height 1.638 m (5' 4.5\") 1.651 m (5' 5\")  1.651 m (5' 5\") 1.651 m (5' 5\")     Weight (lb) 157 148 145.8 143.7 140 140.2    BMI 26.53 kg/m2 24.63 kg/m2 24.26 kg/m2 23.91 kg/m2 23.3 kg/m2 23.33 kg/m2    BSA (m2) 1.8 m2 1.75 m2 1.74 m2 1.73 m2 1.71 m2 1.71 m2         Physical Exam:   Constitutional: Cooperative, in no acute distress, alert, appears stated age.  Skin: Skin color, texture, turgor normal. No rashes or lesions.  Head: Normocephalic. No masses, lesions, tenderness or abnormalities  Eyes: Extraocular movements are grossly intact.  Mouth and throat: Mucous membranes moist  Neck: Neck supple, no carotid bruits, no JVD  Respiratory: Lungs clear to auscultation, no wheezing or rhonchi, no use of accessory muscles  Chest wall: No scars, normal excursion with respiration  Cardiovascular: Regular rhythm without murmur  Gastrointestinal: Abdomen soft, nontender. Bowel sounds normal.  Musculoskeletal: Strength equal in upper extremities  Extremities: No pitting edema  Neurologic: Sensation grossly intact, alert and oriented ×3    Intake/Output:   No intake/output data recorded.    Outpatient Medications  Current Outpatient Medications on File Prior to Visit   Medication Sig Dispense " Refill    albuterol 90 mcg/actuation inhaler Inhale 2 puffs 4 times a day as needed for wheezing or shortness of breath. 54 g 1    azelastine (Astelin) 137 mcg (0.1 %) nasal spray Administer 1 spray into affected nostril(s) 2 times a day.      B complex-vitamin C-folic acid (Nephro-Korey Rx) 1- mg-mg-mcg tablet Take 1 tablet by mouth once daily with breakfast.      cholecalciferol (Vitamin D-3) 25 MCG (1000 UT) tablet Take 1 tablet (25 mcg) by mouth once daily.      coenzyme Q-10 100 mg capsule Take by mouth once daily.      evolocumab (Repatha SureClick) 140 mg/mL injection Inject 1 mL (140 mg) under the skin every 14 (fourteen) days. 2 each 12    famotidine (Pepcid) 20 mg tablet Take 1 tablet (20 mg) by mouth once daily as needed.      fexofenadine (Allegra) 180 mg tablet Take 1 tablet (180 mg) by mouth once daily.      fish oil concentrate (Omega-3) 120-180 mg capsule Take by mouth once daily.      fluticasone furoate-vilanteroL (Breo Ellipta) 200-25 mcg/dose inhaler Inhale 1 puff once daily. 3 each 3    ibuprofen 600 mg tablet Take 1 tablet (600 mg) by mouth every 6 hours if needed.      isosorbide mononitrate ER (Imdur) 60 mg 24 hr tablet TAKE 1 TABLET BY MOUTH DAILY 90 tablet 3    L. acidophilus/Bifid. animalis 32 billion cell capsule Take by mouth once daily as needed.      levothyroxine (Synthroid, Levoxyl) 75 mcg tablet Take 1 tablet (75 mcg) by mouth once daily. 90 tablet 1    montelukast (Singulair) 10 mg tablet Take 1 tablet (10 mg) by mouth once daily. 90 tablet 1    multivit-min-folic acid-biotin (Hair,Skin and Nails,FA-biotin,) 133.3 mcg- 1,666.7 mcg capsule Take by mouth once daily.      naproxen (Naprosyn) 500 mg tablet Take 1 tablet (500 mg) by mouth 2 times a day as needed for pain.      Nexletol 180 mg tablet TAKE 1 TABLET BY MOUTH ONCE  DAILY 90 tablet 3    propranolol (Inderal) 10 mg tablet Take by mouth.      verapamil ER (Veralan PM) 180 mg 24 hr capsule Take 1 capsule (180 mg) by mouth  once daily at bedtime. 90 capsule 1     No current facility-administered medications on file prior to visit.       Labs: (past 26 weeks)  Recent Results (from the past 26 weeks)   Basic metabolic panel    Collection Time: 12/12/24  9:32 AM   Result Value Ref Range    Glucose 95 74 - 99 mg/dL    Sodium 139 136 - 145 mmol/L    Potassium 4.7 3.5 - 5.3 mmol/L    Chloride 102 98 - 107 mmol/L    Bicarbonate 28 21 - 32 mmol/L    Anion Gap 14 10 - 20 mmol/L    Urea Nitrogen 21 6 - 23 mg/dL    Creatinine 1.03 0.50 - 1.05 mg/dL    eGFR 55 (L) >60 mL/min/1.73m*2    Calcium 9.7 8.6 - 10.3 mg/dL   B-Type Natriuretic Peptide    Collection Time: 12/12/24  9:32 AM   Result Value Ref Range    BNP 94 0 - 99 pg/mL   Magnesium    Collection Time: 12/12/24  9:32 AM   Result Value Ref Range    Magnesium 2.33 1.60 - 2.40 mg/dL   Lipid Panel    Collection Time: 12/17/24  8:59 AM   Result Value Ref Range    Cholesterol 125 0 - 199 mg/dL    HDL-Cholesterol 81.2 mg/dL    Cholesterol/HDL Ratio 1.5     LDL Calculated 30 <=99 mg/dL    VLDL 14 0 - 40 mg/dL    Triglycerides 71 0 - 149 mg/dL    Non HDL Cholesterol 44 0 - 149 mg/dL       ECG  Encounter Date: 06/26/24   ECG 12 Lead    Narrative    Sinus bradycardia with sinus arrhythmia, heart rate 58 bpm.       Echocardiogram  No results found for this or any previous visit from the past 1095 days.      CV Studies:  EKG:  Encounter Date: 06/26/24   ECG 12 Lead    Narrative    Sinus bradycardia with sinus arrhythmia, heart rate 58 bpm.     Echocardiogram:   ECHOCARDIOGRAM     Narrative  Ordered by an unspecified provider.    Stress Testing IMGRESULT([id number]):   NM CARDIAC STRESS REST (MYOCARDIAL PERFUSION MIBI) 04/27/2022    Narrative  MRN: [id number]  Patient Name: JACKELINE CHANDLER    STUDY:  CARDIAC STRESS/REST INJECTION; PART 2 STRESS OR REST (NO CHARGE);  CARDIAC STRESS/REST (MYOCARDIAL PERFUSION/MIBI);  4/27/2022 10:16 am    INDICATION:  SOB  I10: HTN (hypertension) E78.00:  Hypercholesterolemia I25.118:  Coronary artery disease with other form of angina pectoris.    COMPARISON:  None.    ACCESSION NUMBER(S):  [id number]; [id number]; [id number]    ORDERING CLINICIAN:  MARYCARMEN LANDA    TECHNIQUE:  DIVISION OF NUCLEAR MEDICINE  STRESS MYOCARDIAL PERFUSION SCAN, ONE DAY PROTOCOL    The patient received an intravenous dose of  11.5 mCi of Tc-99m  tetrofosmin and resting emission tomographic (SPECT) images of the  myocardium were acquired. The patient then exercised via treadmill  stress to  85 % of MPHR and achieved  10.1 METS. At peak stress  35.7  mCi of Tc-99m tetrofosmin were administered and stress phase SPECT  images of the myocardium were then acquired. These included ECG-gated  images to assess and quantify ventricular function.    FINDINGS:  There is a small partially reversible perfusion defect in the apical  wall which resolves on prone imaging suggesting soft tissue  attenuation.    Calculated ejection fraction is 64% without segmental wall motion  abnormalities seen.    Impression  1. There is a small partially reversible perfusion defect in the  apical wall which resolves on prone imaging suggesting soft tissue  attenuation. There is a low probability of ischemia.    2. Calculated ejection fraction is 64% without segmental wall motion  abnormalities seen.    Cardiac Catheterization: No results found for this or any previous visit from the past 1825 days.  No results found for this or any previous visit from the past 3650 days.     Cardiac Scoring: No results found for this or any previous visit from the past 1825 days.    AAA : No results found for this or any previous visit from the past 1825 days.    OTHER: No results found for this or any previous visit from the past 1825 days.    LAST IMAGING RESULTS  ECG 12 Lead  Sinus bradycardia with sinus arrhythmia, heart rate 58 bpm.      Problem List Items Addressed This Visit       CAD (coronary artery disease) - Primary    HTN  (hypertension)    Dyslipidemia    Statin intolerance    (HFpEF) heart failure with preserved ejection fraction    Orthostatic lightheadedness          Enrique Esquivel DO, FACC, FACOI

## 2024-12-23 ENCOUNTER — APPOINTMENT (OUTPATIENT)
Dept: CARDIOLOGY | Facility: CLINIC | Age: 79
End: 2024-12-23
Payer: MEDICARE

## 2024-12-23 VITALS
BODY MASS INDEX: 23.36 KG/M2 | HEIGHT: 65 IN | HEART RATE: 65 BPM | SYSTOLIC BLOOD PRESSURE: 126 MMHG | DIASTOLIC BLOOD PRESSURE: 62 MMHG | WEIGHT: 140.2 LBS

## 2024-12-23 DIAGNOSIS — R42 ORTHOSTATIC LIGHTHEADEDNESS: ICD-10-CM

## 2024-12-23 DIAGNOSIS — I25.10 CORONARY ARTERY DISEASE INVOLVING NATIVE CORONARY ARTERY OF NATIVE HEART, UNSPECIFIED WHETHER ANGINA PRESENT: ICD-10-CM

## 2024-12-23 DIAGNOSIS — I10 PRIMARY HYPERTENSION: ICD-10-CM

## 2024-12-23 DIAGNOSIS — I25.10 CORONARY ARTERY DISEASE INVOLVING NATIVE CORONARY ARTERY OF NATIVE HEART WITHOUT ANGINA PECTORIS: ICD-10-CM

## 2024-12-23 DIAGNOSIS — I50.32 CHRONIC HEART FAILURE WITH PRESERVED EJECTION FRACTION: ICD-10-CM

## 2024-12-23 DIAGNOSIS — Z78.9 STATIN INTOLERANCE: ICD-10-CM

## 2024-12-23 DIAGNOSIS — I25.10 CORONARY ARTERY DISEASE INVOLVING NATIVE CORONARY ARTERY OF NATIVE HEART WITHOUT ANGINA PECTORIS: Primary | ICD-10-CM

## 2024-12-23 DIAGNOSIS — E78.5 DYSLIPIDEMIA: ICD-10-CM

## 2024-12-23 DIAGNOSIS — I50.32 CHRONIC DIASTOLIC HEART FAILURE: ICD-10-CM

## 2024-12-23 DIAGNOSIS — E78.5 DYSLIPIDEMIA: Primary | ICD-10-CM

## 2024-12-23 LAB
ATRIAL RATE: 65 BPM
P AXIS: 86 DEGREES
P OFFSET: 181 MS
P ONSET: 143 MS
PR INTERVAL: 158 MS
Q ONSET: 222 MS
QRS COUNT: 11 BEATS
QRS DURATION: 82 MS
QT INTERVAL: 390 MS
QTC CALCULATION(BAZETT): 405 MS
QTC FREDERICIA: 400 MS
R AXIS: 76 DEGREES
T AXIS: 41 DEGREES
T OFFSET: 417 MS
VENTRICULAR RATE: 65 BPM

## 2024-12-23 PROCEDURE — 1123F ACP DISCUSS/DSCN MKR DOCD: CPT | Performed by: INTERNAL MEDICINE

## 2024-12-23 PROCEDURE — 99214 OFFICE O/P EST MOD 30 MIN: CPT | Mod: 25 | Performed by: INTERNAL MEDICINE

## 2024-12-23 PROCEDURE — 1036F TOBACCO NON-USER: CPT | Performed by: INTERNAL MEDICINE

## 2024-12-23 PROCEDURE — 3078F DIAST BP <80 MM HG: CPT | Performed by: INTERNAL MEDICINE

## 2024-12-23 PROCEDURE — 3074F SYST BP LT 130 MM HG: CPT | Performed by: INTERNAL MEDICINE

## 2024-12-23 PROCEDURE — 93010 ELECTROCARDIOGRAM REPORT: CPT | Performed by: INTERNAL MEDICINE

## 2024-12-23 PROCEDURE — 99214 OFFICE O/P EST MOD 30 MIN: CPT | Performed by: INTERNAL MEDICINE

## 2024-12-23 PROCEDURE — 93005 ELECTROCARDIOGRAM TRACING: CPT | Performed by: INTERNAL MEDICINE

## 2024-12-23 PROCEDURE — 1159F MED LIST DOCD IN RCRD: CPT | Performed by: INTERNAL MEDICINE

## 2025-01-09 ENCOUNTER — APPOINTMENT (OUTPATIENT)
Dept: PRIMARY CARE | Facility: CLINIC | Age: 80
End: 2025-01-09
Payer: MEDICARE

## 2025-01-09 ENCOUNTER — LAB (OUTPATIENT)
Dept: LAB | Facility: LAB | Age: 80
End: 2025-01-09
Payer: MEDICARE

## 2025-01-09 VITALS
TEMPERATURE: 97.3 F | SYSTOLIC BLOOD PRESSURE: 128 MMHG | DIASTOLIC BLOOD PRESSURE: 60 MMHG | HEART RATE: 66 BPM | OXYGEN SATURATION: 98 % | WEIGHT: 135.7 LBS | BODY MASS INDEX: 22.58 KG/M2

## 2025-01-09 DIAGNOSIS — J45.20 MILD INTERMITTENT ASTHMA WITHOUT COMPLICATION (HHS-HCC): ICD-10-CM

## 2025-01-09 DIAGNOSIS — I10 PRIMARY HYPERTENSION: ICD-10-CM

## 2025-01-09 DIAGNOSIS — Z86.0101 HISTORY OF ADENOMATOUS AND SERRATED COLON POLYPS: ICD-10-CM

## 2025-01-09 DIAGNOSIS — E03.9 HYPOTHYROIDISM, UNSPECIFIED TYPE: ICD-10-CM

## 2025-01-09 DIAGNOSIS — R06.02 SHORTNESS OF BREATH: Primary | ICD-10-CM

## 2025-01-09 LAB — TSH SERPL-ACNC: 0.71 MIU/L (ref 0.44–3.98)

## 2025-01-09 PROCEDURE — 1159F MED LIST DOCD IN RCRD: CPT | Performed by: INTERNAL MEDICINE

## 2025-01-09 PROCEDURE — 3074F SYST BP LT 130 MM HG: CPT | Performed by: INTERNAL MEDICINE

## 2025-01-09 PROCEDURE — G2211 COMPLEX E/M VISIT ADD ON: HCPCS | Performed by: INTERNAL MEDICINE

## 2025-01-09 PROCEDURE — 84443 ASSAY THYROID STIM HORMONE: CPT

## 2025-01-09 PROCEDURE — 1123F ACP DISCUSS/DSCN MKR DOCD: CPT | Performed by: INTERNAL MEDICINE

## 2025-01-09 PROCEDURE — 99214 OFFICE O/P EST MOD 30 MIN: CPT | Performed by: INTERNAL MEDICINE

## 2025-01-09 PROCEDURE — 3078F DIAST BP <80 MM HG: CPT | Performed by: INTERNAL MEDICINE

## 2025-01-09 RX ORDER — LEVOTHYROXINE SODIUM 75 UG/1
75 TABLET ORAL DAILY
Qty: 90 TABLET | Refills: 1 | Status: SHIPPED | OUTPATIENT
Start: 2025-01-09

## 2025-01-09 RX ORDER — VERAPAMIL HYDROCHLORIDE 180 MG/1
180 CAPSULE, EXTENDED RELEASE ORAL NIGHTLY
Qty: 90 CAPSULE | Refills: 1 | Status: SHIPPED | OUTPATIENT
Start: 2025-01-09

## 2025-01-09 RX ORDER — MONTELUKAST SODIUM 10 MG/1
10 TABLET ORAL DAILY
Qty: 90 TABLET | Refills: 1 | Status: SHIPPED | OUTPATIENT
Start: 2025-01-09

## 2025-01-09 ASSESSMENT — ENCOUNTER SYMPTOMS
PSYCHIATRIC NEGATIVE: 1
EYES NEGATIVE: 1
GASTROINTESTINAL NEGATIVE: 1
SHORTNESS OF BREATH: 1
ARTHRALGIAS: 1
HEMATOLOGIC/LYMPHATIC NEGATIVE: 1
NEUROLOGICAL NEGATIVE: 1
ALLERGIC/IMMUNOLOGIC NEGATIVE: 1
CARDIOVASCULAR NEGATIVE: 1
ENDOCRINE NEGATIVE: 1
CONSTITUTIONAL NEGATIVE: 1

## 2025-01-09 ASSESSMENT — PATIENT HEALTH QUESTIONNAIRE - PHQ9
1. LITTLE INTEREST OR PLEASURE IN DOING THINGS: NOT AT ALL
SUM OF ALL RESPONSES TO PHQ9 QUESTIONS 1 AND 2: 0
2. FEELING DOWN, DEPRESSED OR HOPELESS: NOT AT ALL

## 2025-01-09 NOTE — PROGRESS NOTES
"Subjective   Patient ID: Maggie Bazan \"IRON\" is a 79 y.o. female who presents for shortness of breath and knee pain .  Patient today has c/o of shortness of breath as well as knee pain.    Patient was scheduled today for follow up but even though she was called and reminded that we needed her lab work done, she neglected to do so until about 1 hour prior to her visit time, so I am unable to assess her thyroid status today.      She did however, state she wanted to be seen due to the above noted complaints.    She states that yesterday she was out and felt very short of breath.  She used her rescue inhaler with complete resolution of Sx.  She states that she is compliant with the Singulair that I prescribe as well as the Breo that is prescribed by Pulmonary.    In re:  knee pain, she states this has only been a couple of days and is in the right knee which she had surgical repair of over 25 years ago.  She states she has no real concern about this, but wanted me to be aware of in case it isn't improving as expected.        Review of Systems   Constitutional: Negative.    HENT: Negative.     Eyes: Negative.    Respiratory:  Positive for shortness of breath.    Cardiovascular: Negative.    Gastrointestinal: Negative.    Endocrine: Negative.    Genitourinary: Negative.    Musculoskeletal:  Positive for arthralgias.   Skin: Negative.    Allergic/Immunologic: Negative.    Neurological: Negative.    Hematological: Negative.    Psychiatric/Behavioral: Negative.         Objective     Blood pressure 128/60, pulse 66, temperature 36.3 °C (97.3 °F), temperature source Temporal, weight 61.6 kg (135 lb 11.2 oz), SpO2 98%.   Physical Exam    Assessment/Plan   Problem List Items Addressed This Visit       Bronchial asthma (HHS-HCC)    Relevant Medications    montelukast (Singulair) 10 mg tablet    HTN (hypertension)    Relevant Medications    verapamil ER (Veralan PM) 180 mg 24 hr capsule    Hypothyroidism    Relevant " Medications    levothyroxine (Synthroid, Levoxyl) 75 mcg tablet    Other Relevant Orders    TSH with reflex to Free T4 if abnormal     Other Visit Diagnoses       Shortness of breath    -  Primary    History of adenomatous and serrated colon polyps        Relevant Orders    Referral to General Surgery          Shortness of breath seems definitely to be bronchospastic due to the very cold, dry air.  Current therapy is appropriate.  She is advised on putting a scarf over her mouth and nose when out in the cold air.  No changes in therapy indicated.  In re:  knee, she will rest and apply heat and notify me if not improving.  Will fill usual Rx presuming lab will be good, but if not, will need to change dose.  She has history of premalignant colon polyp 6 years ago that was not followed up upon by doctor in [place].  Will refer for colonoscopy.    Follow up in 6 months  Lab to be drawn 1 week prior to next office visit.

## 2025-01-25 DIAGNOSIS — Z78.9 STATIN INTOLERANCE: ICD-10-CM

## 2025-01-25 DIAGNOSIS — I25.10 CORONARY ARTERY DISEASE INVOLVING NATIVE CORONARY ARTERY OF NATIVE HEART, UNSPECIFIED WHETHER ANGINA PRESENT: ICD-10-CM

## 2025-01-25 DIAGNOSIS — E78.5 DYSLIPIDEMIA: ICD-10-CM

## 2025-01-25 DIAGNOSIS — I50.32 CHRONIC DIASTOLIC HEART FAILURE: ICD-10-CM

## 2025-01-25 DIAGNOSIS — I10 PRIMARY HYPERTENSION: ICD-10-CM

## 2025-02-03 RX ORDER — EVOLOCUMAB 140 MG/ML
INJECTION, SOLUTION SUBCUTANEOUS
Qty: 6 ML | Refills: 3 | Status: SHIPPED | OUTPATIENT
Start: 2025-02-03

## 2025-02-03 NOTE — TELEPHONE ENCOUNTER
LVM for patient. Pharmacy states pt reported taking Repatha q3wks, it is prescibed [id number]. Need to verify with patient.

## 2025-02-04 NOTE — TELEPHONE ENCOUNTER
Spoke to patient. She states she had increased depression symptoms when taking Repatha [id number]. She states her symptoms have improved reducing it to [id number]. Will send to Dr Esquivel as an FYI.

## 2025-02-19 ENCOUNTER — APPOINTMENT (OUTPATIENT)
Dept: DERMATOLOGY | Facility: CLINIC | Age: 80
End: 2025-02-19
Payer: MEDICARE

## 2025-03-18 ENCOUNTER — APPOINTMENT (OUTPATIENT)
Dept: PRIMARY CARE | Facility: CLINIC | Age: 80
End: 2025-03-18
Payer: MEDICARE

## 2025-03-18 VITALS
SYSTOLIC BLOOD PRESSURE: 150 MMHG | HEART RATE: 69 BPM | DIASTOLIC BLOOD PRESSURE: 70 MMHG | BODY MASS INDEX: 21.99 KG/M2 | HEIGHT: 65 IN | WEIGHT: 132 LBS | OXYGEN SATURATION: 100 %

## 2025-03-18 DIAGNOSIS — Z00.00 ENCOUNTER FOR ANNUAL WELLNESS EXAM IN MEDICARE PATIENT: Primary | ICD-10-CM

## 2025-03-18 DIAGNOSIS — J45.20 MILD INTERMITTENT ASTHMA WITHOUT COMPLICATION (HHS-HCC): ICD-10-CM

## 2025-03-18 DIAGNOSIS — Z00.00 ENCOUNTER FOR PREVENTIVE HEALTH EXAMINATION: ICD-10-CM

## 2025-03-18 DIAGNOSIS — I50.32 CHRONIC DIASTOLIC HEART FAILURE: ICD-10-CM

## 2025-03-18 DIAGNOSIS — N18.31 CHRONIC KIDNEY DISEASE, STAGE 3A (MULTI): ICD-10-CM

## 2025-03-18 DIAGNOSIS — E03.9 HYPOTHYROIDISM, UNSPECIFIED TYPE: ICD-10-CM

## 2025-03-18 PROCEDURE — 99213 OFFICE O/P EST LOW 20 MIN: CPT | Performed by: STUDENT IN AN ORGANIZED HEALTH CARE EDUCATION/TRAINING PROGRAM

## 2025-03-18 PROCEDURE — 1159F MED LIST DOCD IN RCRD: CPT | Performed by: STUDENT IN AN ORGANIZED HEALTH CARE EDUCATION/TRAINING PROGRAM

## 2025-03-18 PROCEDURE — 3077F SYST BP >= 140 MM HG: CPT | Performed by: STUDENT IN AN ORGANIZED HEALTH CARE EDUCATION/TRAINING PROGRAM

## 2025-03-18 PROCEDURE — 1123F ACP DISCUSS/DSCN MKR DOCD: CPT | Performed by: STUDENT IN AN ORGANIZED HEALTH CARE EDUCATION/TRAINING PROGRAM

## 2025-03-18 PROCEDURE — 1160F RVW MEDS BY RX/DR IN RCRD: CPT | Performed by: STUDENT IN AN ORGANIZED HEALTH CARE EDUCATION/TRAINING PROGRAM

## 2025-03-18 PROCEDURE — 1036F TOBACCO NON-USER: CPT | Performed by: STUDENT IN AN ORGANIZED HEALTH CARE EDUCATION/TRAINING PROGRAM

## 2025-03-18 PROCEDURE — G0439 PPPS, SUBSEQ VISIT: HCPCS | Performed by: STUDENT IN AN ORGANIZED HEALTH CARE EDUCATION/TRAINING PROGRAM

## 2025-03-18 PROCEDURE — 3078F DIAST BP <80 MM HG: CPT | Performed by: STUDENT IN AN ORGANIZED HEALTH CARE EDUCATION/TRAINING PROGRAM

## 2025-03-18 PROCEDURE — 99397 PER PM REEVAL EST PAT 65+ YR: CPT | Performed by: STUDENT IN AN ORGANIZED HEALTH CARE EDUCATION/TRAINING PROGRAM

## 2025-03-18 PROCEDURE — 1170F FXNL STATUS ASSESSED: CPT | Performed by: STUDENT IN AN ORGANIZED HEALTH CARE EDUCATION/TRAINING PROGRAM

## 2025-03-18 RX ORDER — MONTELUKAST SODIUM 10 MG/1
10 TABLET ORAL DAILY
Qty: 90 TABLET | Refills: 1 | Status: SHIPPED | OUTPATIENT
Start: 2025-03-18

## 2025-03-18 RX ORDER — LEVOTHYROXINE SODIUM 75 UG/1
75 TABLET ORAL DAILY
Qty: 90 TABLET | Refills: 1 | Status: SHIPPED | OUTPATIENT
Start: 2025-03-18

## 2025-03-18 ASSESSMENT — ACTIVITIES OF DAILY LIVING (ADL)
MANAGING_FINANCES: INDEPENDENT
TAKING_MEDICATION: INDEPENDENT
DRESSING: INDEPENDENT
DOING_HOUSEWORK: INDEPENDENT
GROCERY_SHOPPING: INDEPENDENT
BATHING: INDEPENDENT

## 2025-03-18 ASSESSMENT — PATIENT HEALTH QUESTIONNAIRE - PHQ9
2. FEELING DOWN, DEPRESSED OR HOPELESS: NOT AT ALL
5. POOR APPETITE OR OVEREATING: NOT AT ALL
7. TROUBLE CONCENTRATING ON THINGS, SUCH AS READING THE NEWSPAPER OR WATCHING TELEVISION: NOT AT ALL
6. FEELING BAD ABOUT YOURSELF - OR THAT YOU ARE A FAILURE OR HAVE LET YOURSELF OR YOUR FAMILY DOWN: NOT AT ALL
3. TROUBLE FALLING OR STAYING ASLEEP OR SLEEPING TOO MUCH: NOT AT ALL
8. MOVING OR SPEAKING SO SLOWLY THAT OTHER PEOPLE COULD HAVE NOTICED. OR THE OPPOSITE, BEING SO FIGETY OR RESTLESS THAT YOU HAVE BEEN MOVING AROUND A LOT MORE THAN USUAL: NOT AT ALL
10. IF YOU CHECKED OFF ANY PROBLEMS, HOW DIFFICULT HAVE THESE PROBLEMS MADE IT FOR YOU TO DO YOUR WORK, TAKE CARE OF THINGS AT HOME, OR GET ALONG WITH OTHER PEOPLE: NOT DIFFICULT AT ALL
9. THOUGHTS THAT YOU WOULD BE BETTER OFF DEAD, OR OF HURTING YOURSELF: NOT AT ALL
SUM OF ALL RESPONSES TO PHQ QUESTIONS 1-9: 0
SUM OF ALL RESPONSES TO PHQ9 QUESTIONS 1 AND 2: 0
4. FEELING TIRED OR HAVING LITTLE ENERGY: NOT AT ALL
1. LITTLE INTEREST OR PLEASURE IN DOING THINGS: NOT AT ALL

## 2025-03-18 ASSESSMENT — ANXIETY QUESTIONNAIRES
3. WORRYING TOO MUCH ABOUT DIFFERENT THINGS: NOT AT ALL
6. BECOMING EASILY ANNOYED OR IRRITABLE: NOT AT ALL
4. TROUBLE RELAXING: NOT AT ALL
GAD7 TOTAL SCORE: 0
IF YOU CHECKED OFF ANY PROBLEMS ON THIS QUESTIONNAIRE, HOW DIFFICULT HAVE THESE PROBLEMS MADE IT FOR YOU TO DO YOUR WORK, TAKE CARE OF THINGS AT HOME, OR GET ALONG WITH OTHER PEOPLE: NOT DIFFICULT AT ALL
5. BEING SO RESTLESS THAT IT IS HARD TO SIT STILL: NOT AT ALL
2. NOT BEING ABLE TO STOP OR CONTROL WORRYING: NOT AT ALL
7. FEELING AFRAID AS IF SOMETHING AWFUL MIGHT HAPPEN: NOT AT ALL
1. FEELING NERVOUS, ANXIOUS, OR ON EDGE: NOT AT ALL

## 2025-03-18 ASSESSMENT — ENCOUNTER SYMPTOMS
LOSS OF SENSATION IN FEET: 0
DEPRESSION: 0
OCCASIONAL FEELINGS OF UNSTEADINESS: 0

## 2025-03-18 NOTE — ASSESSMENT & PLAN NOTE
Hypertensive but asx in clinic today (/70).   No changes made to current medications today.   Continue with Cardiology follow-up as scheduled.

## 2025-03-18 NOTE — PROGRESS NOTES
Subjective   Reason for Visit: Maggie Bazan is an 79 y.o. female here for a Medicare Wellness visit.     Past Medical, Surgical, and Family History reviewed and updated in chart.    Reviewed all medications by prescribing practitioner or clinical pharmacist (such as prescriptions, OTCs, herbal therapies and supplements) and documented in the medical record.    HPI    Working on a making diet changes. Does not eat many vegetables (occasional carrot) but plenty of fruit. Lost about 40 lbs over the past few years. Weight remains stable last 6 months. Sometimes will forget to eat. Family calls frequently to ensure she eats. Recently bought Ensure drinks to improve her nutritional intake.   Working on adding regular exercise to their routine. Walks her daughter's dog.   Colon cancer screening completed.  Mammogram up to date H/o bilateral breast cancer (S/p lumpectomy and radiation 20 years ago)  No longer gets cervical cancer screening.  Vaccines are not up to date; they are due for: Tdap (to be completed at local pharmacy)  Dental exam is up to date.  Vision exam is up to date.  Patient is not fasting for labs today.   Social: Tobacco use- Former smoker (20 pack year history; cessation 1992)  Alcohol use- 5-6 drinks on average per week.     HTN/Chronic diastolic heart failure  BP reading of 150/70 today with pt taking verapamil 180 mg daily, Inderal 10 mg daily, and Imdur 60 mg daily. Endorses normotensive home BP readings. Followed by Dr. Esquivel in Cardiology with upcoming follow-up (4/11/2025) who manages her anti-hypertensive and cholesterol medications. Denies CP, SOB, dizziness, palpitations, or BLE edema/swelling.   Pt states daughter (an NP) asked her to ask about a newer screening lab to assess recent cardiac stress. However, pt could not remember the name of this screening lab. Agreeable to follow-up with Cardiology regarding this test.         Patient Self Assessment of Health Status  Patient Self  "Assessment: Good    Nutrition and Exercise  Current Diet: Well Balanced Diet  Adequate Fluid Intake: Yes  Caffeine: Yes  Exercise Frequency: Regularly    Functional Ability/Level of Safety  Cognitive Impairment Observed: No cognitive impairment observed  Cognitive Impairment Reported: No cognitive impairment reported by patient or family    Home Safety Risk Factors: Loose rugs    Patient Care Team:  Lashon Chapman DO as PCP - General (Family Medicine)  Enrique Esquivel DO as Cardiologist (Cardiology)     Review of Systems  All pertinent positive symptoms are included in history of present illness.    All other systems have been reviewed and are negative and noncontributory to this patient's current ailments.    Objective   Vitals:  /70   Pulse 69   Ht 1.651 m (5' 5\")   Wt 59.9 kg (132 lb)   SpO2 100%   BMI 21.97 kg/m²       Physical Exam  CONSTITUTIONAL - INAD. Not ill appearing.  SKIN - No lesions or rashes visualized. Few scattered small bruises on dorsal hands without active bleeding, swelling or lacerations/lesions. Good skin turgor.  HEAD - Atraumatic, normocephalic.  RESP - CTAB. No wheezing, rhonchi, or crackles.   CARDIAC - RRR. No murmurs, gallops, or rubs.  ABDOMEN - Soft, nontender, nondistended. No organomegaly.   PSYCH - Appropriate mood and affect.  EXT: No edema present    Assessment/Plan   Problem List Items Addressed This Visit             ICD-10-CM    Bronchial asthma ([facility]-HCC) J45.909    Relevant Medications    montelukast (Singulair) 10 mg tablet    Hypothyroidism E03.9    Relevant Medications    levothyroxine (Synthroid, Levoxyl) 75 mcg tablet    Chronic diastolic heart failure I50.32     Hypertensive but asx in clinic today (/70).   No changes made to current medications today.   Continue with Cardiology follow-up as scheduled.          Chronic kidney disease, stage 3a (Multi) N18.31     No medication changes made today.   Continue with Cardiology follow-up as scheduled. "   Labs previously placed by Dr. Esquivel in Cardiology to be completed as indicated.          Encounter for annual wellness exam in Medicare patient - Primary Z00.00     A complete history and physical was performed today.  Vaccines are up to date except for Tdap which pt is to complete at her local pharmacy.   Aged out of screening exams including colonoscopy, mammogram, and PAP smear.   Pt to complete labs previously ordered by Cardiology as indicated.   No new labs ordered today.             Other Visit Diagnoses         Codes    Encounter for preventive health examination     Z00.00        Follow up in 6 months

## 2025-03-18 NOTE — ASSESSMENT & PLAN NOTE
No medication changes made today.   Continue with Cardiology follow-up as scheduled.   Labs previously placed by Dr. Esquivel in Cardiology to be completed as indicated.

## 2025-03-28 ENCOUNTER — PATIENT MESSAGE (OUTPATIENT)
Dept: PRIMARY CARE | Facility: CLINIC | Age: 80
End: 2025-03-28
Payer: MEDICARE

## 2025-03-28 DIAGNOSIS — Z12.11 COLON CANCER SCREENING: Primary | ICD-10-CM

## 2025-03-28 DIAGNOSIS — N18.31 CHRONIC KIDNEY DISEASE, STAGE 3A (MULTI): ICD-10-CM

## 2025-04-02 LAB
ALBUMIN/CREAT UR: 24 MG/G CREAT
CREAT UR-MCNC: 85 MG/DL (ref 20–275)
MICROALBUMIN UR-MCNC: 2 MG/DL

## 2025-04-08 ENCOUNTER — OFFICE VISIT (OUTPATIENT)
Dept: PULMONOLOGY | Facility: HOSPITAL | Age: 80
End: 2025-04-08
Payer: MEDICARE

## 2025-04-08 VITALS
RESPIRATION RATE: 16 BRPM | HEIGHT: 65 IN | WEIGHT: 132 LBS | BODY MASS INDEX: 21.99 KG/M2 | DIASTOLIC BLOOD PRESSURE: 82 MMHG | TEMPERATURE: 97.7 F | OXYGEN SATURATION: 97 % | HEART RATE: 65 BPM | SYSTOLIC BLOOD PRESSURE: 157 MMHG

## 2025-04-08 DIAGNOSIS — J30.9 ALLERGIC RHINITIS, UNSPECIFIED SEASONALITY, UNSPECIFIED TRIGGER: ICD-10-CM

## 2025-04-08 DIAGNOSIS — J45.20 MILD INTERMITTENT ASTHMA WITHOUT COMPLICATION (HHS-HCC): Primary | ICD-10-CM

## 2025-04-08 PROCEDURE — 1036F TOBACCO NON-USER: CPT | Performed by: NURSE PRACTITIONER

## 2025-04-08 PROCEDURE — 3077F SYST BP >= 140 MM HG: CPT | Performed by: NURSE PRACTITIONER

## 2025-04-08 PROCEDURE — 3079F DIAST BP 80-89 MM HG: CPT | Performed by: NURSE PRACTITIONER

## 2025-04-08 PROCEDURE — 1160F RVW MEDS BY RX/DR IN RCRD: CPT | Performed by: NURSE PRACTITIONER

## 2025-04-08 PROCEDURE — 1159F MED LIST DOCD IN RCRD: CPT | Performed by: NURSE PRACTITIONER

## 2025-04-08 PROCEDURE — 99213 OFFICE O/P EST LOW 20 MIN: CPT | Performed by: NURSE PRACTITIONER

## 2025-04-08 PROCEDURE — 1123F ACP DISCUSS/DSCN MKR DOCD: CPT | Performed by: NURSE PRACTITIONER

## 2025-04-08 RX ORDER — ALBUTEROL SULFATE 90 UG/1
2 INHALANT RESPIRATORY (INHALATION) 4 TIMES DAILY PRN
Qty: 3 G | Refills: 3 | Status: SHIPPED | OUTPATIENT
Start: 2025-04-08

## 2025-04-08 ASSESSMENT — ENCOUNTER SYMPTOMS
FATIGUE: 0
SHORTNESS OF BREATH: 1
RHINORRHEA: 0
COUGH: 0
CHILLS: 0
WHEEZING: 0
FEVER: 0
UNEXPECTED WEIGHT CHANGE: 0

## 2025-04-08 NOTE — PROGRESS NOTES
"Subjective   Patient ID: Maggie Bazan \"IRON\" is a 79 y.o. female who presents for asthma.    HPI: Patient has  PMH of CAD, chronic diastolic heart failure, HTN, DLD, allergic rhinitis, hypothyroidism, and childhood asthma. She was referred by cardiology for shortness of breath on exertion. She states that she did have a sarcoid removed from her lung about 30 years ago but has not had any issues since. She does use albuterol a few times per week which helps. She takes Montelukast and Flonase daily for allergic rhinitis which helps but she states she still has congestion. She does have a daily dry cough, worse in the morning. She will hear herself wheezing frequently. She denies any fever, chills, chest pain, leg swelling, or hemoptysis. She is a former smoker at 1 ppd for about 15 years, she quit in 1992. She denies any occupational exposures or [place] a farm and having barn animals.      Today she is here for follow up. She states that her breathing has remained stable on Breo. She does not use albuterol often. She states allergies are starting for her and she is taking Allegra, Montelukast, and nasal sprays. She has no other concerns today.    Review of Systems   Constitutional:  Negative for chills, fatigue, fever and unexpected weight change.   HENT:  Positive for congestion. Negative for postnasal drip and rhinorrhea.    Respiratory:  Positive for shortness of breath. Negative for cough (denies hemoptysis.) and wheezing.    Cardiovascular:  Negative for chest pain and leg swelling.   All other systems reviewed and are negative.      Objective   Physical Exam  Vitals reviewed.   Constitutional:       Appearance: Normal appearance.   HENT:      Head: Normocephalic.   Cardiovascular:      Rate and Rhythm: Normal rate and regular rhythm.   Pulmonary:      Effort: Pulmonary effort is normal.      Breath sounds: Decreased breath sounds present.   Skin:     General: Skin is warm and dry.   Neurological:      " Mental Status: She is alert.         Assessment/Plan      1. Bronchial asthma  2. Allergic rhinitis  3. History of Sarcoidosis of the lung   4. GERD  5. Chronic diastolic heart failure  6. Former smoker, quit 1992     Plan:     -I reviewed her CXR with areas of scarring/fibrosis, worse on the left. She reports a history of sarcoidosis of her lung that was biopsied about 30 years ago, this is likely scarring secondary to this. PFTs were done with only mild restriction TLC was 71, will defer CT chest at this time.   -Continue Breo one puff once a day. Offered to try to come off of Breo but she states she has been controlled with this, she would like to continue.   -Continue albuterol prn, she does not use this often.   -, RAST negative, Eos 180.  -Continue Azelastine, continue Flonase.   -Continue Allegra and Montelukast.  -GERD symptoms are controlled with famotidine.   -She is a former smoker at 1 ppd for 15 years, quit in 1992.  -She is following closely with cardiology.      Overall she is still doing well on Breo, I encouraged her to use albuterol prn. I will see her back in one year. I instructed patient to call sooner if needed.      Total time:  25 min.

## 2025-04-08 NOTE — PATIENT INSTRUCTIONS
Continue Breo one puff once a day, everyday.   Continue to use albuterol as needed.   Continue Azelastine and Flonase nasal sprays as discussed.   Continue on Allegra and montelukast.   Call with any questions or concerns.  Follow up with me in one year.    dyspnea on exertion

## 2025-04-12 LAB — NONINV COLON CA DNA+OCC BLD SCRN STL QL: NEGATIVE

## 2025-05-01 DIAGNOSIS — E78.5 DYSLIPIDEMIA: ICD-10-CM

## 2025-05-01 DIAGNOSIS — I25.10 CORONARY ARTERY DISEASE INVOLVING NATIVE CORONARY ARTERY OF NATIVE HEART, UNSPECIFIED WHETHER ANGINA PRESENT: ICD-10-CM

## 2025-05-14 ENCOUNTER — ANCILLARY PROCEDURE (OUTPATIENT)
Dept: URGENT CARE | Age: 80
End: 2025-05-14
Payer: MEDICARE

## 2025-05-14 ENCOUNTER — OFFICE VISIT (OUTPATIENT)
Dept: URGENT CARE | Age: 80
End: 2025-05-14
Payer: MEDICARE

## 2025-05-14 VITALS
SYSTOLIC BLOOD PRESSURE: 168 MMHG | OXYGEN SATURATION: 99 % | TEMPERATURE: 97.9 F | RESPIRATION RATE: 18 BRPM | HEART RATE: 68 BPM | DIASTOLIC BLOOD PRESSURE: 81 MMHG

## 2025-05-14 DIAGNOSIS — S79.911A HIP INJURY, RIGHT, INITIAL ENCOUNTER: ICD-10-CM

## 2025-05-14 DIAGNOSIS — M54.31 RIGHT SIDED SCIATICA: Primary | ICD-10-CM

## 2025-05-14 PROCEDURE — 3079F DIAST BP 80-89 MM HG: CPT

## 2025-05-14 PROCEDURE — 99203 OFFICE O/P NEW LOW 30 MIN: CPT

## 2025-05-14 PROCEDURE — 73502 X-RAY EXAM HIP UNI 2-3 VIEWS: CPT | Mod: RIGHT SIDE

## 2025-05-14 PROCEDURE — 3077F SYST BP >= 140 MM HG: CPT

## 2025-05-14 RX ORDER — CYCLOBENZAPRINE HCL 10 MG
5 TABLET ORAL DAILY
Qty: 10 TABLET | Refills: 0 | Status: SHIPPED | OUTPATIENT
Start: 2025-05-14 | End: 2025-05-16 | Stop reason: ALTCHOICE

## 2025-05-14 ASSESSMENT — ENCOUNTER SYMPTOMS
WOUND: 0
CHILLS: 0
NAUSEA: 0
SHORTNESS OF BREATH: 0
ADENOPATHY: 0
WHEEZING: 0
JOINT SWELLING: 0
ARTHRALGIAS: 1
MYALGIAS: 1
DIARRHEA: 0
VOMITING: 0
FEVER: 0

## 2025-05-14 NOTE — PATIENT INSTRUCTIONS
Recommended is to take 5 mg (1/2 tablet) at bedtime. Note this medication may make you drowsy      Light massage recommended to area of muscle spasm    Warm compresses 2-3 times a day for at least 20 min    If you develop numbness, tingling, abnormal gait, new urinary symptoms, loss of bowel or bladder control go to ER.      Xray sent to radiology, if any additional findings we will give you a call today.

## 2025-05-14 NOTE — PROGRESS NOTES
"Subjective   Patient ID: Maggie Bazan \"IRON\" is a 79 y.o. female. They present today with a chief complaint of Hip Pain (Pt advised that she has had bilateral hip pain for the past week or so. However she is currently having right hip/buttocks pain. Pt is seeing a chiropractor. /Pt advised that she had a fall 3 weeks prior. ).    History of Present Illness  Patient presents for right hip and buttocks pain for the last day or two. She explains that she has pain only with movement and walking, if she is sitting or standing still she does not have any pain. Denies pain with weight bearing. She explains that 4 weeks ago she took a fall but symptoms improved over that time, she does explain that the fall was mechanical, she fell onto her right hip. Denies head injury or loc. Not on blood thinners. She denies any instability in hip. Her pain does occasionally radiate from her back but she explains that she has a history of lumbar spondylosis that she has been seeing a chiropractor for. Denies fever, chills, sweats. Denies n/v/d. Denies chest pain, sob. Denies any radiation of pain into lower leg. Denies numbness, tingling. Denies bruising or skin changes.           Past Medical History  Allergies as of 05/14/2025 - Reviewed 05/14/2025   Allergen Reaction Noted    Ezetimibe Other 07/10/2023    Statins-hmg-coa reductase inhibitors Other and Myalgia 07/10/2023       Prescriptions Prior to Admission[1]     Medical History[2]    Surgical History[3]     reports that she quit smoking about 33 years ago. Her smoking use included cigarettes. She started smoking about 53 years ago. She has a 20 pack-year smoking history. She has never used smokeless tobacco. She reports current alcohol use of about 5.0 - 6.0 standard drinks of alcohol per week. She reports that she does not use drugs.    Review of Systems  Review of Systems   Constitutional:  Negative for chills and fever.   Respiratory:  Negative for shortness of breath and " wheezing.    Cardiovascular:  Negative for chest pain.   Gastrointestinal:  Negative for diarrhea, nausea and vomiting.   Musculoskeletal:  Positive for arthralgias and myalgias. Negative for gait problem and joint swelling.   Skin:  Negative for rash and wound.   Hematological:  Negative for adenopathy.                                  Objective    Vitals:    05/14/25 1410   BP: 168/81   Pulse: 68   Resp: 18   Temp: 36.6 °C (97.9 °F)   SpO2: 99%     No LMP recorded.    Physical Exam  Constitutional:       General: She is not in acute distress.     Appearance: Normal appearance. She is not ill-appearing.   HENT:      Head: Normocephalic and atraumatic.   Eyes:      Extraocular Movements: Extraocular movements intact.      Pupils: Pupils are equal, round, and reactive to light.   Pulmonary:      Effort: Pulmonary effort is normal.   Musculoskeletal:      Cervical back: Normal range of motion.      Thoracic back: No bony tenderness.      Lumbar back: Tenderness present. No bony tenderness. Normal range of motion.      Right hip: No deformity, tenderness, bony tenderness or crepitus. Normal range of motion.      Comments: Tenderness to right lower back and point tenderness over right piriformis, right hip without any findings on physical exam.    Skin:     General: Skin is warm.      Capillary Refill: Capillary refill takes less than 2 seconds.      Findings: No bruising, ecchymosis or rash.   Neurological:      Mental Status: She is alert.         Procedures    Point of Care Test & Imaging Results from this visit  No results found for this visit on 05/14/25.   Imaging  XR hip right with pelvis when performed 2 or 3 views  Result Date: 5/14/2025  No acute pathologic findings are identified. Lower lumbar spondylosis.   MACRO: none   Signed by: Anjum Garcia 5/14/2025 3:09 PM Dictation workstation:   [id number]      Cardiology, Vascular, and Other Imaging  No other imaging results found for the past 2  days      Diagnostic study results (if any) were reviewed by Sheryl Vu PA-C.    Assessment/Plan   Allergies, medications, history, and pertinent labs/EKGs/Imaging reviewed by Sheryl Vu PA-C.     Medical Decision Making  MDM - signs and symptoms consistent with Sciatic nerve dysfunction/pain. No evidence of cauda equine syndrome or significant neurovascular compromise. Xray without any fracture or dislocation.  Patient will otherwise be treated outpatient with supportive measures and muscle relaxant. Given print out on stretches to do at home. advised to return to clinic or presents to er if symptoms change or worsen, otherwise follow up with pcp. patient verbalized understanding and agreed with plan.       Orders and Diagnoses  Diagnoses and all orders for this visit:  Right sided sciatica  -     cyclobenzaprine (Flexeril) 10 mg tablet; Take 0.5 tablets (5 mg) by mouth once daily for 10 days. At bedtime  Hip injury, right, initial encounter  -     XR hip right with pelvis when performed 2 or 3 views; Future      Medical Admin Record      Patient disposition: Home    Electronically signed by Sheryl Vu PA-C  3:47 PM           [1] (Not in a hospital admission)   [2] No past medical history on file.  [3]   Past Surgical History:  Procedure Laterality Date    KNEE SURGERY Right     OTHER SURGICAL HISTORY  04/20/2021    Hysterectomy    OTHER SURGICAL HISTORY  04/20/2021    Lumpectomy    OTHER SURGICAL HISTORY  04/20/2021    Back surgery    OTHER SURGICAL HISTORY  05/07/2021    Cataract surgery    OTHER SURGICAL HISTORY  05/07/2021    Corneal lasik    TONSILLECTOMY      US GUIDED THYROID BIOPSY  8/24/2023    US GUIDED THYROID BIOPSY 8/24/2023 POR US      : Yes

## 2025-05-15 RX ORDER — BEMPEDOIC ACID 180 MG/1
1 TABLET, FILM COATED ORAL DAILY
Qty: 90 TABLET | Refills: 3 | Status: SHIPPED | OUTPATIENT
Start: 2025-05-15

## 2025-05-16 ENCOUNTER — OFFICE VISIT (OUTPATIENT)
Dept: PRIMARY CARE | Facility: CLINIC | Age: 80
End: 2025-05-16
Payer: MEDICARE

## 2025-05-16 VITALS
BODY MASS INDEX: 22.33 KG/M2 | SYSTOLIC BLOOD PRESSURE: 148 MMHG | HEART RATE: 73 BPM | DIASTOLIC BLOOD PRESSURE: 68 MMHG | HEIGHT: 65 IN | WEIGHT: 134 LBS | OXYGEN SATURATION: 98 %

## 2025-05-16 DIAGNOSIS — M25.551 RIGHT HIP PAIN: ICD-10-CM

## 2025-05-16 DIAGNOSIS — M54.50 ACUTE RIGHT-SIDED LOW BACK PAIN WITHOUT SCIATICA: Primary | ICD-10-CM

## 2025-05-16 PROCEDURE — 99214 OFFICE O/P EST MOD 30 MIN: CPT | Performed by: STUDENT IN AN ORGANIZED HEALTH CARE EDUCATION/TRAINING PROGRAM

## 2025-05-16 PROCEDURE — 3077F SYST BP >= 140 MM HG: CPT | Performed by: STUDENT IN AN ORGANIZED HEALTH CARE EDUCATION/TRAINING PROGRAM

## 2025-05-16 PROCEDURE — 1036F TOBACCO NON-USER: CPT | Performed by: STUDENT IN AN ORGANIZED HEALTH CARE EDUCATION/TRAINING PROGRAM

## 2025-05-16 PROCEDURE — 3078F DIAST BP <80 MM HG: CPT | Performed by: STUDENT IN AN ORGANIZED HEALTH CARE EDUCATION/TRAINING PROGRAM

## 2025-05-16 RX ORDER — CYCLOBENZAPRINE HCL 5 MG
5 TABLET ORAL NIGHTLY PRN
Qty: 30 TABLET | Refills: 0 | Status: SHIPPED | OUTPATIENT
Start: 2025-05-16 | End: 2025-07-15

## 2025-05-16 RX ORDER — NAPROXEN 500 MG/1
500 TABLET ORAL 2 TIMES DAILY PRN
Qty: 60 TABLET | Refills: 0 | Status: SHIPPED | OUTPATIENT
Start: 2025-05-16 | End: 2025-08-14

## 2025-05-16 NOTE — PROGRESS NOTES
"Subjective   Patient ID: IRON Bazan is a 79 y.o. female who presents for sciatic pain.    HPI   Low back pain and right-sided hip pain.  Patient presented today in office with concerns of low back pain and right-sided hip pain that has been going on for few days.  She admits that 1 month ago she sustained a mechanical fall which at that time did not require medical attention.  Few weeks later she developed this nagging pain on the lower back bilaterally and she went to see her chiropractor.  After the chiropractic session the left-sided resolved but she continued having right-sided hip pain and went to to urgent care on May 14 for evaluation.  X-rays at that day excluded acute pathologic findings but revealed lower lumbar spondylosis.  She was reassured and recommended to get some muscle relaxants and NSAIDs alongside instruction for home exercises  She has been doing some stretching exercises at home and admits of improving symptoms but very gradually.  Denies any shooting pain down the right lower extremity, denies any numbness or tingling, has full control of her bladder and bowel movements.  Denies any bruising of the area    Review of Systems  All pertinent positive symptoms are included in the history of present illness.    All other systems have been reviewed and are negative and noncontributory to this patient's current ailments.    Objective   /68   Pulse 73   Ht 1.651 m (5' 5\")   Wt 60.8 kg (134 lb)   SpO2 98%   BMI 22.30 kg/m²     Physical Exam  Constitutional:       General: She is not in acute distress.     Appearance: Normal appearance. She is not ill-appearing.   HENT:      Head: Normocephalic and atraumatic.   Eyes:      Extraocular Movements: Extraocular movements intact.      Pupils: Pupils are equal, round, and reactive to light.   Pulmonary:      Effort: Pulmonary effort is normal.   Musculoskeletal:      Cervical back: Normal range of motion.      Thoracic back: No bony tenderness. "      Lumbar back: Tenderness present. No bony tenderness. Normal range of motion.      Right hip: No deformity, tenderness, bony tenderness or crepitus. Normal range of motion.      Comments: Tenderness to right lower back and point tenderness over right piriformis, right hip without any findings on physical exam.    Skin:     General: Skin is warm.      Capillary Refill: Capillary refill takes less than 2 seconds.      Findings: No bruising, ecchymosis or rash.   Neurological:      Mental Status: She is alert.     Assessment/Plan   Diagnoses and all orders for this visit:  Acute right-sided low back pain without sciatica  Right hip pain  -     cyclobenzaprine (Flexeril) 5 mg tablet; Take 1 tablet (5 mg) by mouth as needed at bedtime for muscle spasms.  -     naproxen (Naprosyn) 500 mg tablet; Take 1 tablet (500 mg) by mouth 2 times a day as needed for mild pain (1 - 3) (pain).  -     Referral to Physical Therapy; Future  Reviewed previous notes and x-rays results  Patient was reassured against any acute pathologic finding including her hip bones or lumbar spine.  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  Referral to physical therapy for more specific exercise was provided today  Prescription of naproxen 500 mg twice daily alongside Flexeril 5 mg 3 times daily as needed was provided.  Patient was advised to return to the office in 6 weeks if symptoms persist.    Thank you for letting us be a part of your care team.  Please call the office if you have further questions or concerns regarding your care    Chay Eddy MD  PGY2,  Resident

## 2025-06-13 ENCOUNTER — APPOINTMENT (OUTPATIENT)
Dept: PRIMARY CARE | Facility: CLINIC | Age: 80
End: 2025-06-13
Payer: MEDICARE

## 2025-06-17 NOTE — PROGRESS NOTES
[facility] – [place], [place] Heart and Vascular Cardiology    Patient Name: Maggie Bazan  Patient : 1945    Scribe Attestation  By signing my name below, Viviana VILLATORO, Aimeeibgiana attest that this documentation has been prepared under the direction and in the presence of Enrique Esquivel DO.    Physician Attestation  Enrique VILLATORO DO, personally performed the services described in the documentation as scribed by Viviana Rider in my presence, and confirm it is both accurate and complete.    Reason for visit:  This is an 79-year-old female here for follow-up regarding coronary artery disease as seen on cardiac catheterization done in 2017, HFpEF, hypertension, dyslipidemia with reported statin/Zetia intolerance.     HPI:  This is an 79-year-old female here for follow-up regarding coronary artery disease as seen on cardiac catheterization done in 2017, HFpEF, hypertension, dyslipidemia with reported statin/Zetia intolerance.  The patient was last evaluated by me in 2024.  At that visit I ordered blood work including BMP/BNP/magnesium be drawn in 6 months, and asked the patient to follow-up in 6 months and sooner if necessary.  TSH done in 2025 was 0.71. BMP done on 2025 showed normal serum sodium and serum potassium with a serum creatinine of 0.97. Serum magnesium was 2.0. BNP was 27. TSH was 0.36, FT4 was 1.5. CBC showed a hemoglobin of 13.7. Lipid panel done in 2025 showed an LDL cholesterol of 70 and triglycerides of  81 on bempedoic acid 180 mg daily and  Repatha 140 mg subcutaneous every 3 weeks. ECG done today showed sinus bradycardia with a heart rate of 58 bpm.  The patient reports that she has been feeling generally well from the cardiac standpoint. She states that she had an episode of mechanical fall but denies any associated chest pain, shortness of breath, palpitations and lightheadedness. She states that she takes all of her medications as prescribed.  During my exam, she was resting comfortably on the exam table.              Assessment/Plan:   1. Coronary artery disease  The patient has a reported history of history of coronary artery disease as seen on cardiac catheterization done in June 2017.  She also had a history of vasospasm and is on Imdur and verapamil for symptom control.  Nuclear stress done 4/27/2022 showed a small partially reversible perfusion defect in the apical wall which resolved on prone imaging consistent with soft tissue attenuation, low probability of ischemia, calculated ejection fraction 64%.  Outside echocardiogram done 3/8/2021 showed normal left ventricular systolic function with an ejection fraction of 62%, left ventricular diastolic function, normal right ventricular systolic function, mild mitral and tricuspid valve regurgitation.  ECG done today showed sinus bradycardia with a heart rate of 58 bpm.    She denies anginal chest discomfort.   Blood pressure appears controlled on exam today.  She should continue her current antihypertensive medications.  Recent lab works as noted in the HPI.   Lipid panel done in June 2025 showed an LDL cholesterol of 70 and triglycerides of  81 on bempedoic acid 180 mg daily and  Repatha 140 mg subcutaneous every 2 weeks.  Echocardiogram and lab works as noted below will be done in 6 months prior to his next visit.   Please see lifestyle recommendations below.  Follow up in 6 months and sooner if necessary.      2. HFpEF  The patient has HFpEF.  Nuclear stress done 4/27/2022 showed a small partially reversible perfusion defect in the apical wall which resolved on prone imaging consistent with soft tissue attenuation, low probability of ischemia, calculated ejection fraction 64%.  Outside echocardiogram done 3/8/2021 showed normal left ventricular systolic function with an ejection fraction of 62%, left ventricular diastolic function, normal right ventricular systolic function, mild mitral and  tricuspid valve regurgitation.  She does not appear significantly volume overloaded on physical exam.  She should continue current cardiac medications.   Recent lab works as noted in the HPI.   Echocardiogram and lab works as noted below will be done in 6 months prior to his next visit.  I discussed with her the importance of following a low-sodium heart healthy diet.  Follow up in 6 months and sooner if necessary.      3. Hypertension  The patient has a history of hypertension and blood pressure appears controlled on exam today.   She should continue her current antihypertensive medications and monitor her blood pressure at home.      4. Dyslipidemia/Statin and Zetia intolerance  Lipid panel done in June 2025 showed an LDL cholesterol of 70 and triglycerides of  81 on bempedoic acid 180 mg daily and  Repatha 140 mg subcutaneous every 3 weeks.  Please see lifestyle recommendations below.         Orders:   BMP/BNP/Mg in 6 months,   Echocardiogram in 6 months,   Follow-up in 6 months.    Lifestyle Recommendations  I recommend a whole-food plant-based diet, an eating pattern that encourages the consumption of unrefined plant foods (such as fruits, vegetables, tubers, whole grains, legumes, nuts and seeds) and discourages meats, dairy products, eggs and processed foods.     The AHA/ACC recommends that the patient consume a dietary pattern that emphasizes intake of vegetables, fruits, and whole grains; includes low-fat dairy products, poultry, fish, legumes, non-tropical vegetable oils, and nuts; and limits intake of sodium, sweets, sugar-sweetened beverages, and red meats.  Adapt this dietary pattern to appropriate calorie requirements (a 500-750 kcal/day deficit to loose weight), personal and cultural food preferences, and nutrition therapy for other medical conditions (including diabetes).  Achieve this pattern by following plans such as the [place] Mediterranean, DASH dietary pattern, or AHA diet.     Engage in 2 hours  and 30 minutes per week of moderate-intensity physical activity, or 1 hour and 15 minutes (75 minutes) per week of vigorous-intensity aerobic physical activity, or an equivalent combination of moderate and vigorous-intensity aerobic physical activity. Aerobic activity should be performed in episodes of at least 10 minutes preferably spread throughout the week.     Adhering to a heart healthy diet, regular exercise habits, avoidance of tobacco products, and maintenance of a healthy weight are crucial components of their heart disease risk reduction.     Any positive review of systems not specifically addressed in the office visit today should be evaluated and treated by the patients primary care physician or in an emergency department if necessary     Patient was notified that results from ordered tests will be called to the patient if it changes current management; it will otherwise be discussed at a future appointment and available on  [email or web address].     Thank you for allowing me to participate in the care of this patient.        This document was generated using the assistance of voice recognition software. If there are any errors of spelling, grammar, syntax, or meaning; please feel free to contact me directly for clarification.    Past Medical History:  She has no past medical history on file.    Past Surgical History:  She has a past surgical history that includes Other surgical history (04/20/2021); Other surgical history (04/20/2021); Other surgical history (04/20/2021); Other surgical history (05/07/2021); Other surgical history (05/07/2021); Tonsillectomy; Knee surgery (Right); and US guided thyroid biopsy (8/24/2023).      Social History:  She reports that she quit smoking about 33 years ago. Her smoking use included cigarettes. She started smoking about 53 years ago. She has a 20 pack-year smoking history. She has never used smokeless tobacco. She reports current alcohol use of about 5.0 - 6.0 standard drinks  of alcohol per week. She reports that she does not use drugs.    Family History:  Family History  Problem Relation Name Age of Onset    Hypertension Mother      Hyperlipidemia Mother      Hyperlipidemia Father      Heart attack Mother's Brother      Heart attack Mother's Brother      Breast cancer Father's Sister      Breast cancer Father's Sister          Allergies:  Ezetimibe and Statins-hmg-coa reductase inhibitors    Outpatient Medications:  Current Outpatient Medications   Medication Instructions    albuterol 90 mcg/actuation inhaler 2 puffs, inhalation, 4 times daily PRN    azelastine (Astelin) 137 mcg (0.1 %) nasal spray 1 spray, 2 times daily    B complex-vitamin C-folic acid (Nephro-Korey Rx) 1- mg-mg-mcg tablet 1 tablet, Daily with breakfast    cholecalciferol (Vitamin D-3) 25 MCG (1000 UT) tablet 1 tablet, Daily    coenzyme Q-10 100 mg capsule Daily    cyclobenzaprine (FLEXERIL) 5 mg, oral, Nightly PRN    famotidine (Pepcid) 20 mg tablet 1 tablet, Daily PRN    fexofenadine (Allegra) 180 mg tablet 1 tablet, Daily    fish oil concentrate (Omega-3) 120-180 mg capsule Daily    fluticasone furoate-vilanteroL (Breo Ellipta) 200-25 mcg/dose inhaler 1 puff, inhalation, Daily    ibuprofen 600 mg, Every 6 hours PRN    isosorbide mononitrate ER (IMDUR) 60 mg, oral, Daily    L. acidophilus/Bifid. animalis 32 billion cell capsule Daily PRN    levothyroxine (SYNTHROID, LEVOXYL) 75 mcg, oral, Daily    montelukast (SINGULAIR) 10 mg, oral, Daily    multivit-min-folic acid-biotin (Hair,Skin and Nails,FA-biotin,) 133.3 mcg- 1,666.7 mcg capsule Daily    naproxen (NAPROSYN) 500 mg, oral, 2 times daily PRN    Nexletol 180 mg tablet 1 tablet, oral, Daily    propranolol (Inderal) 10 mg tablet Take by mouth.    Repatha SureClick 140 mg/mL injection INJECT 140MG SUBCUTANEOUSLY  EVERY 2 WEEKS    verapamil ER (VERALAN PM) 180 mg, oral, Nightly        ROS:  A 14 point review of systems was done and is negative other than as  "stated in HPI    Vitals:      7/9/2024    11:42 AM 12/23/2024    11:19 AM 1/9/2025    11:58 AM 3/18/2025     9:36 AM 4/8/2025     2:45 PM 5/14/2025     2:10 PM 5/16/2025    11:12 AM   Vitals   Systolic 122 126 128 150 157 168 148   Diastolic 68 62 60 70 82 81 68   BP Location Left arm Left arm Left arm       Heart Rate  65 66 69 65 68 73   Temp   36.3 °C (97.3 °F)  36.5 °C (97.7 °F) 36.6 °C (97.9 °F)    Resp     16 18    Height  1.651 m (5' 5\")  1.651 m (5' 5\") 1.651 m (5' 5\")  1.651 m (5' 5\")   Weight (lb)  140.2 135.7 132 132  134   BMI  23.33 kg/m2 22.58 kg/m2 21.97 kg/m2 21.97 kg/m2  22.3 kg/m2   BSA (m2)  1.71 m2 1.68 m2 1.66 m2 1.66 m2  1.67 m2        Physical Exam:   Constitutional: Cooperative, in no acute distress, alert, appears stated age.  Skin: Skin color, texture, turgor normal. No rashes or lesions.  Head: Normocephalic. No masses, lesions, tenderness or abnormalities  Eyes: Extraocular movements are grossly intact.  Mouth and throat: Mucous membranes moist  Neck: Neck supple, no carotid bruits, no JVD  Respiratory: Lungs clear to auscultation, no wheezing or rhonchi, no use of accessory muscles  Chest wall: No scars, normal excursion with respiration  Cardiovascular: Bradycardic, regular rhythm without murmur  Gastrointestinal: Abdomen soft, nontender. Bowel sounds normal.  Musculoskeletal: Strength equal in upper extremities  Extremities: No pitting edema  Neurologic: Sensation grossly intact, alert and oriented ×3      Intake/Output:   No intake/output data recorded.    Outpatient Medications  Current Outpatient Medications on File Prior to Visit   Medication Sig Dispense Refill    albuterol 90 mcg/actuation inhaler Inhale 2 puffs 4 times a day as needed for wheezing or shortness of breath. 3 g 3    azelastine (Astelin) 137 mcg (0.1 %) nasal spray Administer 1 spray into affected nostril(s) 2 times a day.      B complex-vitamin C-folic acid (Nephro-Korey Rx) 1- mg-mg-mcg tablet Take 1 tablet " by mouth once daily with breakfast.      cholecalciferol (Vitamin D-3) 25 MCG (1000 UT) tablet Take 1 tablet (25 mcg) by mouth once daily.      coenzyme Q-10 100 mg capsule Take by mouth once daily.      cyclobenzaprine (Flexeril) 5 mg tablet Take 1 tablet (5 mg) by mouth as needed at bedtime for muscle spasms. 30 tablet 0    famotidine (Pepcid) 20 mg tablet Take 1 tablet (20 mg) by mouth once daily as needed.      fexofenadine (Allegra) 180 mg tablet Take 1 tablet (180 mg) by mouth once daily.      fish oil concentrate (Omega-3) 120-180 mg capsule Take by mouth once daily.      fluticasone furoate-vilanteroL (Breo Ellipta) 200-25 mcg/dose inhaler Inhale 1 puff once daily. 3 each 3    ibuprofen 600 mg tablet Take 1 tablet (600 mg) by mouth every 6 hours if needed.      isosorbide mononitrate ER (Imdur) 60 mg 24 hr tablet TAKE 1 TABLET BY MOUTH DAILY 90 tablet 3    L. acidophilus/Bifid. animalis 32 billion cell capsule Take by mouth once daily as needed.      levothyroxine (Synthroid, Levoxyl) 75 mcg tablet Take 1 tablet (75 mcg) by mouth once daily. 90 tablet 1    montelukast (Singulair) 10 mg tablet Take 1 tablet (10 mg) by mouth once daily. 90 tablet 1    multivit-min-folic acid-biotin (Hair,Skin and Nails,FA-biotin,) 133.3 mcg- 1,666.7 mcg capsule Take by mouth once daily.      naproxen (Naprosyn) 500 mg tablet Take 1 tablet (500 mg) by mouth 2 times a day as needed for mild pain (1 - 3) (pain). 60 tablet 0    Nexletol 180 mg tablet TAKE 1 TABLET BY MOUTH ONCE  DAILY 90 tablet 3    propranolol (Inderal) 10 mg tablet Take by mouth.      Repatha SureClick 140 mg/mL injection INJECT 140MG SUBCUTANEOUSLY  EVERY 2 WEEKS 6 mL 3    verapamil ER (Veralan PM) 180 mg 24 hr capsule Take 1 capsule (180 mg) by mouth once daily at bedtime. 90 capsule 1     No current facility-administered medications on file prior to visit.       Labs: (past 26 weeks)  Recent Results (from the past 26 weeks)   ECG 12 lead (Clinic Performed)     Collection Time: 12/23/24 11:25 AM   Result Value Ref Range    Ventricular Rate 65 BPM    Atrial Rate 65 BPM    NH Interval 158 ms    QRS Duration 82 ms    QT Interval 390 ms    QTC Calculation(Bazett) 405 ms    P Axis 86 degrees    R Axis 76 degrees    T Axis 41 degrees    QRS Count 11 beats    Q Onset 222 ms    P Onset 143 ms    P Offset 181 ms    T Offset 417 ms    QTC Fredericia 400 ms   TSH with reflex to Free T4 if abnormal    Collection Time: 01/09/25  9:59 AM   Result Value Ref Range    Thyroid Stimulating Hormone 0.71 0.44 - 3.98 mIU/L   Albumin-Creatinine Ratio, Urine Random    Collection Time: 04/01/25 10:52 AM   Result Value Ref Range    CREATININE, RANDOM URINE 85 20 - 275 mg/dL    ALBUMIN, URINE 2.0 See Note: mg/dL    ALBUMIN/CREATININE RATIO, RANDOM URINE 24 <30 mg/g creat   Cologuard® colon cancer screening    Collection Time: 04/07/25 10:43 AM   Result Value Ref Range    NONINV COLON CA DNA+OCC BLD SCRN STL QL Negative Negative       ECG  Encounter Date: 12/23/24   ECG 12 lead (Clinic Performed)   Result Value    Ventricular Rate 65    Atrial Rate 65    NH Interval 158    QRS Duration 82    QT Interval 390    QTC Calculation(Bazett) 405    P Axis 86    R Axis 76    T Axis 41    QRS Count 11    Q Onset 222    P Onset 143    P Offset 181    T Offset 417    QTC Fredericia 400    Narrative    Normal sinus rhythm  Low voltage QRS  Nonspecific ST abnormality  Abnormal ECG  No previous ECGs available  Confirmed by Enrique Esquivel ([id number]) on 12/23/2024 12:43:44 PM       Echocardiogram  No results found for this or any previous visit from the past 1095 days.      CV Studies:  EKG:  Encounter Date: 12/23/24   ECG 12 lead (Clinic Performed)   Result Value    Ventricular Rate 65    Atrial Rate 65    NH Interval 158    QRS Duration 82    QT Interval 390    QTC Calculation(Bazett) 405    P Axis 86    R Axis 76    T Axis 41    QRS Count 11    Q Onset 222    P Onset 143    P Offset 181    T Offset 417    QTC Fredericia  400    Narrative    Normal sinus rhythm  Low voltage QRS  Nonspecific ST abnormality  Abnormal ECG  No previous ECGs available  Confirmed by Enrique Esquivel ([id number]) on 12/23/2024 12:43:44 PM     Echocardiogram:   ECHOCARDIOGRAM     Narrative  Ordered by an unspecified provider.    Stress Testing LIZETH([id number]):   NM CARDIAC STRESS REST (MYOCARDIAL PERFUSION MIBI) 04/27/2022    Narrative  MRN: [id number]  Patient Name: JACKELINE CHANDLER    STUDY:  CARDIAC STRESS/REST INJECTION; PART 2 STRESS OR REST (NO CHARGE);  CARDIAC STRESS/REST (MYOCARDIAL PERFUSION/MIBI);  4/27/2022 10:16 am    INDICATION:  SOB  I10: HTN (hypertension) E78.00: Hypercholesterolemia I25.118:  Coronary artery disease with other form of angina pectoris.    COMPARISON:  None.    ACCESSION NUMBER(S):  [id number]; [id number]; [id number]    ORDERING CLINICIAN:  ENRIQUE ESQUIVEL    TECHNIQUE:  DIVISION OF NUCLEAR MEDICINE  STRESS MYOCARDIAL PERFUSION SCAN, ONE DAY PROTOCOL    The patient received an intravenous dose of  11.5 mCi of Tc-99m  tetrofosmin and resting emission tomographic (SPECT) images of the  myocardium were acquired. The patient then exercised via treadmill  stress to  85 % of MPHR and achieved  10.1 METS. At peak stress  35.7  mCi of Tc-99m tetrofosmin were administered and stress phase SPECT  images of the myocardium were then acquired. These included ECG-gated  images to assess and quantify ventricular function.    FINDINGS:  There is a small partially reversible perfusion defect in the apical  wall which resolves on prone imaging suggesting soft tissue  attenuation.    Calculated ejection fraction is 64% without segmental wall motion  abnormalities seen.    Impression  1. There is a small partially reversible perfusion defect in the  apical wall which resolves on prone imaging suggesting soft tissue  attenuation. There is a low probability of ischemia.    2. Calculated ejection fraction is 64% without segmental wall motion  abnormalities  seen.    Cardiac Catheterization: No results found for this or any previous visit from the past 1825 days.  No results found for this or any previous visit from the past 3650 days.     Cardiac Scoring: No results found for this or any previous visit from the past 1825 days.    AAA : No results found for this or any previous visit from the past 1825 days.    OTHER: No results found for this or any previous visit from the past 1825 days.    LAST IMAGING RESULTS  XR hip right with pelvis when performed 2 or 3 views  Narrative: Interpreted By:  Anjum Garcia,   STUDY:  XR HIP RIGHT WITH PELVIS WHEN PERFORMED 2 OR 3 VIEWS; 5/14/2025 2:58  pm      INDICATION:  Signs/Symptoms:right hip injury 3 weeks ago.      COMPARISON:  None.      ACCESSION NUMBER(S):  [id number]      ORDERING CLINICIAN:  JOHNY PAUL      TECHNIQUE:  Right hip two views with AP pelvis      FINDINGS:  No fractures or destructive lesions are identified. Pelvic ring is  intact. Incidental note is made of disc space narrowing and marginal  spurring lower lumbar spine.      Impression: No acute pathologic findings are identified.  Lower lumbar spondylosis.      MACRO:  none      Signed by: Anjum Garcia 5/14/2025 3:09 PM  Dictation workstation:   [id number]      Problem List Items Addressed This Visit       CAD (coronary artery disease) - Primary    HTN (hypertension)    Dyslipidemia    Statin intolerance    (HFpEF) heart failure with preserved ejection fraction          Enrique Esquivel DO, FACC, FACOI

## 2025-06-19 LAB
ANION GAP SERPL CALCULATED.4IONS-SCNC: 11 MMOL/L (CALC) (ref 7–17)
BNP SERPL-MCNC: 27 PG/ML
BUN SERPL-MCNC: 18 MG/DL (ref 7–25)
BUN/CREAT SERPL: 19 (CALC) (ref 6–22)
CALCIUM SERPL-MCNC: 10.1 MG/DL (ref 8.6–10.4)
CHLORIDE SERPL-SCNC: 105 MMOL/L (ref 98–110)
CHOLEST SERPL-MCNC: 155 MG/DL
CHOLEST/HDLC SERPL: 2.2 (CALC)
CO2 SERPL-SCNC: 25 MMOL/L (ref 20–32)
CREAT SERPL-MCNC: 0.97 MG/DL (ref 0.6–0.95)
EGFRCR SERPLBLD CKD-EPI 2021: 59 ML/MIN/1.73M2
ERYTHROCYTE [DISTWIDTH] IN BLOOD BY AUTOMATED COUNT: 13.8 % (ref 11–15)
GLUCOSE SERPL-MCNC: 104 MG/DL (ref 65–99)
HCT VFR BLD AUTO: 43 % (ref 35–45)
HDLC SERPL-MCNC: 69 MG/DL
HGB BLD-MCNC: 13.7 G/DL (ref 11.7–15.5)
LDLC SERPL CALC-MCNC: 70 MG/DL (CALC)
MAGNESIUM SERPL-MCNC: 2 MG/DL (ref 1.5–2.5)
MCH RBC QN AUTO: 30.2 PG (ref 27–33)
MCHC RBC AUTO-ENTMCNC: 31.9 G/DL (ref 32–36)
MCV RBC AUTO: 94.7 FL (ref 80–100)
NONHDLC SERPL-MCNC: 86 MG/DL (CALC)
PLATELET # BLD AUTO: 154 THOUSAND/UL (ref 140–400)
PMV BLD REES-ECKER: 10.7 FL (ref 7.5–12.5)
POTASSIUM SERPL-SCNC: 4.5 MMOL/L (ref 3.5–5.3)
RBC # BLD AUTO: 4.54 MILLION/UL (ref 3.8–5.1)
SODIUM SERPL-SCNC: 141 MMOL/L (ref 135–146)
T4 FREE SERPL-MCNC: 1.5 NG/DL (ref 0.8–1.8)
TRIGL SERPL-MCNC: 81 MG/DL
TSH SERPL-ACNC: 0.36 MIU/L (ref 0.4–4.5)
WBC # BLD AUTO: 4.1 THOUSAND/UL (ref 3.8–10.8)

## 2025-06-20 ENCOUNTER — APPOINTMENT (OUTPATIENT)
Dept: CARDIOLOGY | Facility: HOSPITAL | Age: 80
End: 2025-06-20
Payer: MEDICARE

## 2025-06-27 ENCOUNTER — TELEPHONE (OUTPATIENT)
Dept: CARDIOLOGY | Facility: HOSPITAL | Age: 80
End: 2025-06-27
Payer: MEDICARE

## 2025-06-27 NOTE — TELEPHONE ENCOUNTER
Left VM with patient, was instructed to contact the office to confirm appointment with provider on 06/30.

## 2025-06-30 ENCOUNTER — PATIENT MESSAGE (OUTPATIENT)
Dept: PRIMARY CARE | Facility: CLINIC | Age: 80
End: 2025-06-30

## 2025-06-30 ENCOUNTER — OFFICE VISIT (OUTPATIENT)
Dept: CARDIOLOGY | Facility: HOSPITAL | Age: 80
End: 2025-06-30
Payer: MEDICARE

## 2025-06-30 VITALS
HEIGHT: 65 IN | DIASTOLIC BLOOD PRESSURE: 58 MMHG | HEART RATE: 58 BPM | BODY MASS INDEX: 22.82 KG/M2 | SYSTOLIC BLOOD PRESSURE: 134 MMHG | WEIGHT: 137 LBS

## 2025-06-30 DIAGNOSIS — E78.5 DYSLIPIDEMIA: ICD-10-CM

## 2025-06-30 DIAGNOSIS — I10 PRIMARY HYPERTENSION: ICD-10-CM

## 2025-06-30 DIAGNOSIS — I50.32 CHRONIC DIASTOLIC HEART FAILURE: ICD-10-CM

## 2025-06-30 DIAGNOSIS — I25.10 CORONARY ARTERY DISEASE INVOLVING NATIVE CORONARY ARTERY OF NATIVE HEART, UNSPECIFIED WHETHER ANGINA PRESENT: ICD-10-CM

## 2025-06-30 DIAGNOSIS — Z78.9 STATIN INTOLERANCE: ICD-10-CM

## 2025-06-30 DIAGNOSIS — I50.32 CHRONIC HEART FAILURE WITH PRESERVED EJECTION FRACTION: ICD-10-CM

## 2025-06-30 DIAGNOSIS — I25.10 CORONARY ARTERY DISEASE INVOLVING NATIVE CORONARY ARTERY OF NATIVE HEART WITHOUT ANGINA PECTORIS: Primary | ICD-10-CM

## 2025-06-30 DIAGNOSIS — R42 ORTHOSTATIC LIGHTHEADEDNESS: ICD-10-CM

## 2025-06-30 DIAGNOSIS — J45.20 MILD INTERMITTENT ASTHMA WITHOUT COMPLICATION (HHS-HCC): ICD-10-CM

## 2025-06-30 DIAGNOSIS — I50.32 CHRONIC HEART FAILURE WITH PRESERVED EJECTION FRACTION: Primary | ICD-10-CM

## 2025-06-30 LAB
ATRIAL RATE: 58 BPM
P AXIS: 87 DEGREES
P OFFSET: 187 MS
P ONSET: 137 MS
PR INTERVAL: 170 MS
Q ONSET: 222 MS
QRS COUNT: 10 BEATS
QRS DURATION: 80 MS
QT INTERVAL: 408 MS
QTC CALCULATION(BAZETT): 400 MS
QTC FREDERICIA: 403 MS
R AXIS: 0 DEGREES
T AXIS: 30 DEGREES
T OFFSET: 426 MS
VENTRICULAR RATE: 58 BPM

## 2025-06-30 PROCEDURE — 93010 ELECTROCARDIOGRAM REPORT: CPT | Performed by: INTERNAL MEDICINE

## 2025-06-30 PROCEDURE — 93005 ELECTROCARDIOGRAM TRACING: CPT | Performed by: INTERNAL MEDICINE

## 2025-06-30 PROCEDURE — 1036F TOBACCO NON-USER: CPT | Performed by: INTERNAL MEDICINE

## 2025-06-30 PROCEDURE — 3078F DIAST BP <80 MM HG: CPT | Performed by: INTERNAL MEDICINE

## 2025-06-30 PROCEDURE — 99214 OFFICE O/P EST MOD 30 MIN: CPT | Performed by: INTERNAL MEDICINE

## 2025-06-30 PROCEDURE — 1159F MED LIST DOCD IN RCRD: CPT | Performed by: INTERNAL MEDICINE

## 2025-06-30 PROCEDURE — 99212 OFFICE O/P EST SF 10 MIN: CPT | Mod: 25 | Performed by: INTERNAL MEDICINE

## 2025-06-30 PROCEDURE — 3075F SYST BP GE 130 - 139MM HG: CPT | Performed by: INTERNAL MEDICINE

## 2025-07-07 ENCOUNTER — APPOINTMENT (OUTPATIENT)
Dept: PRIMARY CARE | Facility: CLINIC | Age: 80
End: 2025-07-07
Payer: MEDICARE

## 2025-07-26 ENCOUNTER — PATIENT MESSAGE (OUTPATIENT)
Dept: CARDIOLOGY | Facility: HOSPITAL | Age: 80
End: 2025-07-26
Payer: MEDICARE

## 2025-07-26 DIAGNOSIS — I10 PRIMARY HYPERTENSION: ICD-10-CM

## 2025-07-28 RX ORDER — VERAPAMIL HYDROCHLORIDE 180 MG/1
180 CAPSULE, DELAYED RELEASE ORAL NIGHTLY
Qty: 90 CAPSULE | Refills: 1 | Status: SHIPPED | OUTPATIENT
Start: 2025-07-28

## 2025-08-05 DIAGNOSIS — E03.9 HYPOTHYROIDISM, UNSPECIFIED TYPE: ICD-10-CM

## 2025-08-05 DIAGNOSIS — J45.20 MILD INTERMITTENT ASTHMA WITHOUT COMPLICATION (HHS-HCC): ICD-10-CM

## 2025-08-06 RX ORDER — MONTELUKAST SODIUM 10 MG/1
10 TABLET ORAL DAILY
Qty: 90 TABLET | Refills: 3 | OUTPATIENT
Start: 2025-08-06

## 2025-08-06 RX ORDER — LEVOTHYROXINE SODIUM 75 UG/1
75 TABLET ORAL DAILY
Qty: 90 TABLET | Refills: 3 | OUTPATIENT
Start: 2025-08-06

## 2026-04-07 ENCOUNTER — APPOINTMENT (OUTPATIENT)
Dept: PULMONOLOGY | Facility: HOSPITAL | Age: 81
End: 2026-04-07
Payer: MEDICARE